# Patient Record
Sex: MALE | Race: BLACK OR AFRICAN AMERICAN | Employment: UNEMPLOYED | URBAN - METROPOLITAN AREA
[De-identification: names, ages, dates, MRNs, and addresses within clinical notes are randomized per-mention and may not be internally consistent; named-entity substitution may affect disease eponyms.]

---

## 2022-10-25 ENCOUNTER — OFFICE VISIT (OUTPATIENT)
Dept: FAMILY MEDICINE CLINIC | Age: 52
End: 2022-10-25
Payer: COMMERCIAL

## 2022-10-25 VITALS
HEART RATE: 64 BPM | WEIGHT: 293 LBS | DIASTOLIC BLOOD PRESSURE: 88 MMHG | TEMPERATURE: 98 F | RESPIRATION RATE: 20 BRPM | SYSTOLIC BLOOD PRESSURE: 124 MMHG | BODY MASS INDEX: 43.4 KG/M2 | HEIGHT: 69 IN

## 2022-10-25 DIAGNOSIS — E11.9 TYPE 2 DIABETES MELLITUS WITHOUT COMPLICATION, WITHOUT LONG-TERM CURRENT USE OF INSULIN (HCC): Primary | ICD-10-CM

## 2022-10-25 DIAGNOSIS — B00.9 HSV INFECTION: ICD-10-CM

## 2022-10-25 DIAGNOSIS — Z76.89 ENCOUNTER TO ESTABLISH CARE: ICD-10-CM

## 2022-10-25 DIAGNOSIS — Z99.89 OSA ON CPAP: ICD-10-CM

## 2022-10-25 DIAGNOSIS — M54.16 LUMBAR BACK PAIN WITH RADICULOPATHY AFFECTING RIGHT LOWER EXTREMITY: ICD-10-CM

## 2022-10-25 DIAGNOSIS — E78.2 MIXED HYPERLIPIDEMIA: ICD-10-CM

## 2022-10-25 DIAGNOSIS — I10 ESSENTIAL HYPERTENSION: ICD-10-CM

## 2022-10-25 DIAGNOSIS — I48.0 PAROXYSMAL A-FIB (HCC): ICD-10-CM

## 2022-10-25 DIAGNOSIS — G47.33 OSA ON CPAP: ICD-10-CM

## 2022-10-25 PROBLEM — I48.20 CHRONIC A-FIB (HCC): Status: ACTIVE | Noted: 2022-10-25

## 2022-10-25 LAB — HBA1C MFR BLD: 6.3 %

## 2022-10-25 PROCEDURE — G8484 FLU IMMUNIZE NO ADMIN: HCPCS | Performed by: NURSE PRACTITIONER

## 2022-10-25 PROCEDURE — G8427 DOCREV CUR MEDS BY ELIG CLIN: HCPCS | Performed by: NURSE PRACTITIONER

## 2022-10-25 PROCEDURE — 1036F TOBACCO NON-USER: CPT | Performed by: NURSE PRACTITIONER

## 2022-10-25 PROCEDURE — 3017F COLORECTAL CA SCREEN DOC REV: CPT | Performed by: NURSE PRACTITIONER

## 2022-10-25 PROCEDURE — 83036 HEMOGLOBIN GLYCOSYLATED A1C: CPT | Performed by: NURSE PRACTITIONER

## 2022-10-25 PROCEDURE — 2022F DILAT RTA XM EVC RTNOPTHY: CPT | Performed by: NURSE PRACTITIONER

## 2022-10-25 PROCEDURE — G8417 CALC BMI ABV UP PARAM F/U: HCPCS | Performed by: NURSE PRACTITIONER

## 2022-10-25 PROCEDURE — 3046F HEMOGLOBIN A1C LEVEL >9.0%: CPT | Performed by: NURSE PRACTITIONER

## 2022-10-25 PROCEDURE — 99204 OFFICE O/P NEW MOD 45 MIN: CPT | Performed by: NURSE PRACTITIONER

## 2022-10-25 RX ORDER — ACYCLOVIR 400 MG/1
TABLET ORAL
COMMUNITY
Start: 2022-08-07 | End: 2022-11-29 | Stop reason: SDUPTHER

## 2022-10-25 RX ORDER — ATORVASTATIN CALCIUM 40 MG/1
TABLET, FILM COATED ORAL
COMMUNITY
Start: 2022-08-07

## 2022-10-25 RX ORDER — WARFARIN SODIUM 5 MG/1
TABLET ORAL
COMMUNITY
Start: 2022-10-08

## 2022-10-25 RX ORDER — LOSARTAN POTASSIUM 100 MG/1
TABLET ORAL
COMMUNITY
Start: 2022-10-10

## 2022-10-25 SDOH — ECONOMIC STABILITY: TRANSPORTATION INSECURITY
IN THE PAST 12 MONTHS, HAS LACK OF TRANSPORTATION KEPT YOU FROM MEETINGS, WORK, OR FROM GETTING THINGS NEEDED FOR DAILY LIVING?: NO

## 2022-10-25 SDOH — ECONOMIC STABILITY: FOOD INSECURITY: WITHIN THE PAST 12 MONTHS, THE FOOD YOU BOUGHT JUST DIDN'T LAST AND YOU DIDN'T HAVE MONEY TO GET MORE.: NEVER TRUE

## 2022-10-25 SDOH — ECONOMIC STABILITY: FOOD INSECURITY: WITHIN THE PAST 12 MONTHS, YOU WORRIED THAT YOUR FOOD WOULD RUN OUT BEFORE YOU GOT MONEY TO BUY MORE.: NEVER TRUE

## 2022-10-25 SDOH — ECONOMIC STABILITY: TRANSPORTATION INSECURITY
IN THE PAST 12 MONTHS, HAS THE LACK OF TRANSPORTATION KEPT YOU FROM MEDICAL APPOINTMENTS OR FROM GETTING MEDICATIONS?: NO

## 2022-10-25 ASSESSMENT — PATIENT HEALTH QUESTIONNAIRE - PHQ9
SUM OF ALL RESPONSES TO PHQ9 QUESTIONS 1 & 2: 0
SUM OF ALL RESPONSES TO PHQ QUESTIONS 1-9: 0
2. FEELING DOWN, DEPRESSED OR HOPELESS: 0
1. LITTLE INTEREST OR PLEASURE IN DOING THINGS: 0
SUM OF ALL RESPONSES TO PHQ QUESTIONS 1-9: 0

## 2022-10-25 ASSESSMENT — SOCIAL DETERMINANTS OF HEALTH (SDOH): HOW HARD IS IT FOR YOU TO PAY FOR THE VERY BASICS LIKE FOOD, HOUSING, MEDICAL CARE, AND HEATING?: NOT HARD AT ALL

## 2022-10-25 ASSESSMENT — ENCOUNTER SYMPTOMS
EYE DISCHARGE: 0
CHEST TIGHTNESS: 0
NAUSEA: 0
SHORTNESS OF BREATH: 0
CONSTIPATION: 0
ABDOMINAL PAIN: 0
SINUS PAIN: 0
DIARRHEA: 0
COLOR CHANGE: 0
COUGH: 0
SINUS PRESSURE: 0
ABDOMINAL DISTENTION: 0
BACK PAIN: 1

## 2022-10-25 NOTE — PATIENT INSTRUCTIONS
Patient Education        Low Back Pain: Exercises  Introduction  Here are some examples of exercises for you to try. The exercises may be suggested for a condition or for rehabilitation. Start each exercise slowly. Ease off the exercises if you start to have pain. You will be told when to start these exercises and which ones will work best for you. How to do the exercises  Press-up  Lie on your stomach, supporting your body with your forearms. Press your elbows down into the floor to raise your upper back. As you do this, relax your stomach muscles and allow your back to arch without using your back muscles. As your press up, do not let your hips or pelvis come off the floor. Hold for 15 to 30 seconds, then relax. Repeat 2 to 4 times. Alternate arm and leg (bird dog) exercise  Do this exercise slowly. Try to keep your body straight at all times, and do not let one hip drop lower than the other. Start on the floor, on your hands and knees. Tighten your belly muscles. Raise one leg off the floor, and hold it straight out behind you. Be careful not to let your hip drop down, because that will twist your trunk. Hold for about 6 seconds, then lower your leg and switch to the other leg. Repeat 8 to 12 times on each leg. Over time, work up to holding for 10 to 30 seconds each time. If you feel stable and secure with your leg raised, try raising the opposite arm straight out in front of you at the same time. Knee-to-chest exercise  Lie on your back with your knees bent and your feet flat on the floor. Bring one knee to your chest, keeping the other foot flat on the floor (or keeping the other leg straight, whichever feels better on your lower back). Keep your lower back pressed to the floor. Hold for at least 15 to 30 seconds. Relax, and lower the knee to the starting position. Repeat with the other leg. Repeat 2 to 4 times with each leg.   To get more stretch, put your other leg flat on the floor while pulling your knee to your chest.  Curl-ups  Lie on the floor on your back with your knees bent at a 90-degree angle. Your feet should be flat on the floor, about 12 inches from your buttocks. Cross your arms over your chest. If this bothers your neck, try putting your hands behind your neck (not your head), with your elbows spread apart. Slowly tighten your belly muscles and raise your shoulder blades off the floor. Keep your head in line with your body, and do not press your chin to your chest.  Hold this position for 1 or 2 seconds, then slowly lower yourself back down to the floor. Repeat 8 to 12 times. Pelvic tilt exercise  Lie on your back with your knees bent. \"Brace\" your stomach. This means to tighten your muscles by pulling in and imagining your belly button moving toward your spine. You should feel like your back is pressing to the floor and your hips and pelvis are rocking back. Hold for about 6 seconds while you breathe smoothly. Repeat 8 to 12 times. Heel dig bridging  Lie on your back with both knees bent and your ankles bent so that only your heels are digging into the floor. Your knees should be bent about 90 degrees. Then push your heels into the floor, squeeze your buttocks, and lift your hips off the floor until your shoulders, hips, and knees are all in a straight line. Hold for about 6 seconds as you continue to breathe normally, and then slowly lower your hips back down to the floor and rest for up to 10 seconds. Do 8 to 12 repetitions. Hamstring stretch in doorway  Lie on your back in a doorway, with one leg through the open door. Slide your leg up the wall to straighten your knee. You should feel a gentle stretch down the back of your leg. Hold the stretch for at least 15 to 30 seconds. Do not arch your back, point your toes, or bend either knee. Keep one heel touching the floor and the other heel touching the wall. Repeat with your other leg.   Do 2 to 4 times for each leg.  Hip flexor stretch  Kneel on the floor with one knee bent and one leg behind you. Place your forward knee over your foot. Keep your other knee touching the floor. Slowly push your hips forward until you feel a stretch in the upper thigh of your rear leg. Hold the stretch for at least 15 to 30 seconds. Repeat with your other leg. Do 2 to 4 times on each side. Wall sit  Stand with your back 10 to 12 inches away from a wall. Lean into the wall until your back is flat against it. Slowly slide down until your knees are slightly bent, pressing your lower back into the wall. Hold for about 6 seconds, then slide back up the wall. Repeat 8 to 12 times. Follow-up care is a key part of your treatment and safety. Be sure to make and go to all appointments, and call your doctor if you are having problems. It's also a good idea to know your test results and keep a list of the medicines you take. Current as of: March 9, 2022               Content Version: 13.4  © 2006-2022 [employer]. Care instructions adapted under license by [facility] ([facility]). If you have questions about a medical condition or this instruction, always ask your healthcare professional. [place] [address] any warranty or liability for your use of this information. Patient Education        Sciatica: Exercises  Introduction  Here are some examples of typical rehabilitation exercises for your condition. Start each exercise slowly. Ease off the exercise if you start to have pain. Your doctor or physical therapist will tell you when you can start these exercises and which ones will work best for you. When you are not being active, find a comfortable position for rest. Some people are comfortable on the floor or a medium-firm bed with a small pillow under their head and another under their knees. Some people prefer to lie on their side with a pillow between their knees. Don't stay in one position for too long.   Take short walks (10 to 20 minutes) every 2 to 3 hours. Avoid slopes, hills, and stairs until you feel better. Walk only distances you can manage without pain, especially leg pain. How to do the exercises  Back stretches  Get down on your hands and knees on the floor. Relax your head and allow it to droop. Round your back up toward the ceiling until you feel a nice stretch in your upper, middle, and lower back. Hold this stretch for as long as it feels comfortable, or about 15 to 30 seconds. Return to the starting position with a flat back while you are on your hands and knees. Let your back sway by pressing your stomach toward the floor. Lift your buttocks toward the ceiling. Hold this position for 15 to 30 seconds. Repeat 2 to 4 times. Follow-up care is a key part of your treatment and safety. Be sure to make and go to all appointments, and call your doctor if you are having problems. It's also a good idea to know your test results and keep a list of the medicines you take. Current as of: March 9, 2022               Content Version: 13.4  © 2006-2022 [employer]. Care instructions adapted under license by [facility] ([facility]). If you have questions about a medical condition or this instruction, always ask your healthcare professional. [address] any warranty or liability for your use of this information. Patient Education        Back Stretches: Exercises  Introduction  Here are some examples of exercises for stretching your back. Start each exercise slowly. Ease off the exercise if you start to have pain. Your doctor or physical therapist will tell you when you can start these exercises and which ones will work best for you. How to do the exercises  Overhead stretch  Stand comfortably with your feet shoulder-width apart. Looking straight ahead, raise both arms over your head and reach toward the ceiling. Do not allow your head to tilt back.   Hold for 15 to 30 seconds,

## 2022-10-25 NOTE — PROGRESS NOTES
Date of Service:  10/25/2022    Zack Palacio (:  1970) is a 46 y.o. male, here for evaluation of the following medical concerns:    Chief Complaint   Patient presents with    New Patient     Establishing care, questions about disability         HPI    Patient here today to establish care. Patient previously a patient through [facility] internal medicine. Pt switching because  and patient fighting for his disability- pt has sleep apnea, afib, loop recorder, back pain with curvature in spine. Patient says [facility] not trying to help him other than writing a letter to job saying he can't lift more than 20 lbs. They don't want to complete paperwork. Pt has seen neurosurgery, says they keep giving shots in his back and says the shots help for a few months, maybe 4 months and takes ibuprofen for pain but that doesn't help much and neurosurgery is the one that gave him a note for his job to not lift more than 20 lbs. Pt says this is also second time with loop recorder as well, follows with [facility] cardiology as well. 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 Pt currently not working. Pt says he has not seen neurosurgery in over a year. Says he has been trying to see them but has not gotten return calls. Says his internal medicine with [facility] was supposedly sending them to try and get him connected but pt still had not heard anything back. Pt thinks last injection was around summer 2021. Pt has never seen pain management. Pt says he deals with the pain. Pt on losartan 100 mg for HTN. BP well controlled today. Pt is diabetic, A1C 6.4 2022. Will repeat that level today. Metformin 500 mg daily. Pt has LOBO and wears CPAP nightly. Pt follows with sleep medicine at [facility]- and when he naps he wears as well. HSV- takes acyclovir daily. Last outbreak- none. Afib- managed by cardiology. Coumadin managed by cardiology as well. Last INR 3.1. Pt thinks his goal is 2-3.  Reviewed cardiology note from 5 days ago and this appears accurate as they note he is mildly supratherapeutic. Pt also on daily lipitor for ASCVD elevated risk. Review of Systems   Constitutional:  Negative for activity change, appetite change, fatigue, fever and unexpected weight change. HENT:  Negative for congestion, ear pain, sinus pressure and sinus pain. Eyes:  Negative for discharge and visual disturbance. Respiratory:  Negative for cough, chest tightness and shortness of breath. Cardiovascular:  Negative for chest pain, palpitations and leg swelling. Gastrointestinal:  Negative for abdominal distention, abdominal pain, constipation, diarrhea and nausea. Endocrine: Negative for cold intolerance, heat intolerance, polydipsia, polyphagia and polyuria. Genitourinary:  Negative for decreased urine volume, difficulty urinating, dysuria, flank pain, frequency and urgency. Musculoskeletal:  Positive for back pain. Negative for arthralgias, gait problem, joint swelling, myalgias and neck pain. Skin:  Negative for color change, rash and wound. Allergic/Immunologic: Negative for food allergies and immunocompromised state. Neurological:  Positive for numbness. Negative for dizziness, tremors, speech difficulty, weakness, light-headedness and headaches. Hematological:  Negative for adenopathy. Does not bruise/bleed easily. Psychiatric/Behavioral:  Negative for confusion, decreased concentration, self-injury, sleep disturbance and suicidal ideas. The patient is not nervous/anxious. Prior to Visit Medications    Medication Sig Taking?  Authorizing Provider   acyclovir (ZOVIRAX) 400 MG tablet TAKE 1 TABLET BY MOUTH TWICE A DAY Yes Historical Provider, MD   atorvastatin (LIPITOR) 40 MG tablet TAKE 1 TABLET BY MOUTH EVERY DAY Yes Historical Provider, MD   losartan (COZAAR) 100 MG tablet TAKE 1 TABLET BY MOUTH EVERY DAY Yes Historical Provider, MD   metFORMIN (GLUCOPHAGE) 500 MG tablet TAKE 1 TABLET BY MOUTH EVERY DAY Yes Historical Provider, MD   warfarin (COUMADIN) 5 MG tablet TAKE 2 TABS TUES, THURS, SAT AND SUN AND 3 TABS MON, WED, FRI OR AS DIRECTED Yes Historical Provider, MD        No Known Allergies    History reviewed. No pertinent past medical history. History reviewed. No pertinent surgical history. Social History     Tobacco Use    Smoking status: Former     Packs/day: 0.50     Types: Cigarettes     Start date: 1990     Quit date: 2010     Years since quittin.8    Smokeless tobacco: Never   Vaping Use    Vaping Use: Former   Substance Use Topics    Drug use: Yes     Types: Marijuana Priti Cotton)     Comment: occasionally- weekly        History reviewed. No pertinent family history. Vitals:    10/25/22 0903   BP: 124/88   Site: Right Upper Arm   Position: Sitting   Cuff Size: Large Adult   Pulse: 64   Resp: 20   Temp: 98 °F (36.7 °C)   TempSrc: Temporal   Weight: 293 lb (132.9 kg)   Height: 5' 9\" (1.753 m)     Estimated body mass index is 43.27 kg/m² as calculated from the following:    Height as of this encounter: 5' 9\" (1.753 m). Weight as of this encounter: 293 lb (132.9 kg). Physical Exam  Vitals reviewed. Constitutional:       General: He is awake. Appearance: Normal appearance. He is well-developed and well-groomed. He is morbidly obese. He is not ill-appearing or toxic-appearing. HENT:      Head: Normocephalic and atraumatic. Right Ear: Hearing, tympanic membrane, ear canal and external ear normal.      Left Ear: Hearing, tympanic membrane, ear canal and external ear normal.      Nose: Nose normal.      Mouth/Throat:      Lips: Pink. Mouth: Mucous membranes are moist.      Pharynx: Oropharynx is clear. Eyes:      General: Lids are normal.      Extraocular Movements: Extraocular movements intact. Conjunctiva/sclera: Conjunctivae normal.      Pupils: Pupils are equal, round, and reactive to light. Neck:      Thyroid: No thyromegaly. Vascular: No carotid bruit. Cardiovascular:      Rate and Rhythm: Normal rate. Pulses: Normal pulses. Carotid pulses are 2+ on the right side and 2+ on the left side. Radial pulses are 2+ on the right side and 2+ on the left side. Posterior tibial pulses are 2+ on the right side and 2+ on the left side. Heart sounds: Normal heart sounds, S1 normal and S2 normal. Heart sounds not distant. No murmur heard. Pulmonary:      Effort: Pulmonary effort is normal.      Breath sounds: Normal breath sounds. Abdominal:      General: Bowel sounds are normal. There is no abdominal bruit. Palpations: Abdomen is soft. Tenderness: There is no abdominal tenderness. Genitourinary:     Comments: Deferred  Musculoskeletal:         General: Normal range of motion. Cervical back: Full passive range of motion without pain, normal range of motion and neck supple. Right lower leg: No edema. Left lower leg: No edema. Lymphadenopathy:      Head:      Right side of head: No submental, submandibular, tonsillar, preauricular, posterior auricular or occipital adenopathy. Left side of head: No submental, submandibular, tonsillar, preauricular, posterior auricular or occipital adenopathy. Cervical: No cervical adenopathy. Right cervical: No superficial, deep or posterior cervical adenopathy. Left cervical: No superficial, deep or posterior cervical adenopathy. Upper Body:      Right upper body: No supraclavicular adenopathy. Left upper body: No supraclavicular adenopathy. Skin:     General: Skin is warm and dry. Capillary Refill: Capillary refill takes less than 2 seconds. Neurological:      General: No focal deficit present. Mental Status: He is alert and oriented to person, place, and time. Mental status is at baseline. Motor: Motor function is intact. No weakness. Coordination: Coordination is intact. Gait: Gait is intact.    Psychiatric: Attention and Perception: Attention and perception normal.         Mood and Affect: Mood and affect normal.         Speech: Speech normal.         Behavior: Behavior normal. Behavior is cooperative. Thought Content: Thought content normal.         Cognition and Memory: Cognition and memory normal.         Judgment: Judgment normal.       ASSESSMENT/PLAN:  1. Type 2 diabetes mellitus without complication, without long-term current use of insulin (HCC)  -     POCT glycosylated hemoglobin (Hb A1C)   A1C today is 6.3   Continue metformin 500 mg daily   Information printed and reviewed   Weight loss, initial goal 10% of body weight recommended   Physical activity 150 minutes weekly recommended   2. Encounter to establish care   Discussed office policies/practices/provider team   Reviewed medical, social, family history and medications   MyChart activation and usage discussed  3. Essential hypertension   Continue losartan 100 mg daily    BP well controlled at visit today   Follow a low sodium diet   Physical activity 150 minutes weekly recommended    Weight loss, initial goal 10% of body weight recommended   4. LOBO on CPAP   Continue with CPAP   Follow with [facility] sleep medicine regularly   Work on weight loss  5. Mixed hyperlipidemia   Continue on lipitor 40 mg nightly   Due for lipid panel spring 2023   Work on limiting saturated fats in diet, and eating a healthy balance of fruits, vegetables, lean proteins, and multigrains. Physical activity 150 minutes weekly recommended    Weight loss, initial goal 10% of body weight recommended  6. HSV infection   Continue on daily acyclovir  7. Paroxysmal A-fib (HCC)   Continue on coumadin, follow with [facility] cardiology for INR management  8.  Lumbar back pain with radiculopathy affecting right lower extremity  -     [facility] - Alonzo Lezama MD, Neurosurgery (Spine), [facility]   Continue on ibuprofen PRN   Given back exercises and stretches   Counseled on weight loss and posture   Ice 15 min/heat 15 min at least twice daily   Voltaren gel over the counter 4 times daily as needed for aches/pains    Care Gaps Addressed  Lipids March 2022 through [facility]- reviewed in Care everywhere  HIV screen recommended  Hep C screen recommended with next blood draw   Diabetes screen due today  Colon cancer screening discussed- says he had one a few years ago in [facility], remembers drinking the stuff and having camera up his behind  Call insurance company to discuss coverage for shingles vaccine (Shingrix) 2 dose series   COVID booster recommended  Flu vaccine recommended - insurance restrictions here        I have reviewed patient's pertinent medical history, relevant laboratory and imaging studies, and past/future health maintenance. Discussed with the patient the importance of adhering to their current medication regimen as directed. Advised the patient that they should continue to work on eating a healthy balanced diet and staying active by exercising within their personal limits. Orders as listed above. Patient was advised to keep future appointments with their respective specialty care team(s). Patient had the opportunity to ask questions, all of which were answered to the best of my ability and with patient satisfaction. Patient understands and is agreeable with the care plan following today's visit. Patient is to schedule an appointment for any new or worsening symptoms. Go to ER for significant shortness of breath, chest pain, or uncontrolled pain or fever. I discussed with patient the risk and benefits of any medications that were prescribed today. I verified that the patient understands their medications, labs, and/or procedures. The patient is doing well with current medication regimen and does not have any barriers to adherence. The patient's self-management abilities are good.      Return in about 6 months (around 4/25/2023) for Physical Exam and Labs, Diabetes Follow Up, HTN Follow Up, Fasting Labs. An  electronic signature was used to authenticate this note.     --KAMALA Garcia - CNP on 10/25/2022 at 9:45 AM

## 2022-10-27 ENCOUNTER — TELEPHONE (OUTPATIENT)
Dept: FAMILY MEDICINE CLINIC | Age: 52
End: 2022-10-27

## 2022-10-27 DIAGNOSIS — M54.16 LUMBAR BACK PAIN WITH RADICULOPATHY AFFECTING RIGHT LOWER EXTREMITY: Primary | ICD-10-CM

## 2022-10-27 NOTE — TELEPHONE ENCOUNTER
He needs to get a MRI of his back - needs a referral     He called [facility] - they want him to have the MRI done first and then they will make him an appt.         Pt @  [phone] - please let him know when done

## 2022-11-08 ENCOUNTER — HOSPITAL ENCOUNTER (OUTPATIENT)
Dept: MRI IMAGING | Age: 52
Discharge: HOME | End: 2022-11-08
Payer: COMMERCIAL

## 2022-11-08 DIAGNOSIS — M54.16 LUMBAR BACK PAIN WITH RADICULOPATHY AFFECTING RIGHT LOWER EXTREMITY: ICD-10-CM

## 2022-11-08 PROCEDURE — 72148 MRI LUMBAR SPINE W/O DYE: CPT

## 2022-11-29 ENCOUNTER — TELEPHONE (OUTPATIENT)
Dept: FAMILY MEDICINE CLINIC | Age: 52
End: 2022-11-29

## 2022-11-29 DIAGNOSIS — E11.9 TYPE 2 DIABETES MELLITUS WITHOUT COMPLICATION, WITHOUT LONG-TERM CURRENT USE OF INSULIN (HCC): ICD-10-CM

## 2022-11-29 DIAGNOSIS — B00.9 HSV INFECTION: Primary | ICD-10-CM

## 2022-11-29 RX ORDER — ACYCLOVIR 400 MG/1
TABLET ORAL
Qty: 90 TABLET | Refills: 3 | Status: SHIPPED | OUTPATIENT
Start: 2022-11-29

## 2022-12-06 ENCOUNTER — TELEPHONE (OUTPATIENT)
Dept: FAMILY MEDICINE CLINIC | Age: 52
End: 2022-12-06

## 2022-12-06 DIAGNOSIS — B00.9 HSV INFECTION: ICD-10-CM

## 2022-12-06 RX ORDER — ACYCLOVIR 400 MG/1
TABLET ORAL
Qty: 180 TABLET | Refills: 3 | Status: SHIPPED | OUTPATIENT
Start: 2022-12-06

## 2022-12-06 NOTE — TELEPHONE ENCOUNTER
Adjusted to twice daily.  My note said daily from our discussion but I adjusted script and sent for BID

## 2022-12-06 NOTE — TELEPHONE ENCOUNTER
Patient is calling because the direction on the medication ACYCLOVIR 400 MG TABLET IS DIFFERENT FROM HIS LAST REFILL. DID WE CHANGE THE DIRECTION? PLEASE GIVE HIM A CALL BACK.

## 2023-01-10 ENCOUNTER — OFFICE VISIT (OUTPATIENT)
Dept: FAMILY MEDICINE CLINIC | Age: 53
End: 2023-01-10
Payer: COMMERCIAL

## 2023-01-10 VITALS
DIASTOLIC BLOOD PRESSURE: 80 MMHG | HEART RATE: 74 BPM | OXYGEN SATURATION: 96 % | RESPIRATION RATE: 16 BRPM | TEMPERATURE: 98.3 F | WEIGHT: 294 LBS | HEIGHT: 69 IN | SYSTOLIC BLOOD PRESSURE: 132 MMHG | BODY MASS INDEX: 43.55 KG/M2

## 2023-01-10 DIAGNOSIS — F12.90 MARIJUANA USE: ICD-10-CM

## 2023-01-10 DIAGNOSIS — E78.6 LOW HDL (UNDER 40): ICD-10-CM

## 2023-01-10 DIAGNOSIS — Z11.59 ENCOUNTER FOR HEPATITIS C SCREENING TEST FOR LOW RISK PATIENT: ICD-10-CM

## 2023-01-10 DIAGNOSIS — Z01.818 PRE-OP EXAMINATION: Primary | ICD-10-CM

## 2023-01-10 DIAGNOSIS — Z12.5 PROSTATE CANCER SCREENING: ICD-10-CM

## 2023-01-10 DIAGNOSIS — M48.061 LUMBAR FORAMINAL STENOSIS: ICD-10-CM

## 2023-01-10 LAB
A/G RATIO: 1.6 (ref 1.1–2.2)
ALBUMIN SERPL-MCNC: 4.2 G/DL (ref 3.4–5)
ALP BLD-CCNC: 83 U/L (ref 40–129)
ALT SERPL-CCNC: 37 U/L (ref 10–40)
ANION GAP SERPL CALCULATED.3IONS-SCNC: 12 MMOL/L (ref 3–16)
APTT: 30.5 SEC (ref 23–34.3)
AST SERPL-CCNC: 20 U/L (ref 15–37)
BASOPHILS ABSOLUTE: 0 K/UL (ref 0–0.2)
BASOPHILS RELATIVE PERCENT: 0.3 %
BILIRUB SERPL-MCNC: 0.3 MG/DL (ref 0–1)
BUN BLDV-MCNC: 17 MG/DL (ref 7–20)
CALCIUM SERPL-MCNC: 9.8 MG/DL (ref 8.3–10.6)
CHLORIDE BLD-SCNC: 106 MMOL/L (ref 99–110)
CHOLESTEROL, TOTAL: 125 MG/DL (ref 0–199)
CO2: 24 MMOL/L (ref 21–32)
CREAT SERPL-MCNC: 1 MG/DL (ref 0.9–1.3)
EOSINOPHILS ABSOLUTE: 0.1 K/UL (ref 0–0.6)
EOSINOPHILS RELATIVE PERCENT: 1.5 %
GFR SERPL CREATININE-BSD FRML MDRD: >60 ML/MIN/{1.73_M2}
GLUCOSE BLD-MCNC: 116 MG/DL (ref 70–99)
HCT VFR BLD CALC: 41.6 % (ref 40.5–52.5)
HDLC SERPL-MCNC: 38 MG/DL (ref 40–60)
HEMOGLOBIN: 13.2 G/DL (ref 13.5–17.5)
HEPATITIS C ANTIBODY INTERPRETATION: NORMAL
INR BLD: 1.63 (ref 0.87–1.14)
LDL CHOLESTEROL CALCULATED: 74 MG/DL
LYMPHOCYTES ABSOLUTE: 2.5 K/UL (ref 1–5.1)
LYMPHOCYTES RELATIVE PERCENT: 30.9 %
MCH RBC QN AUTO: 23.2 PG (ref 26–34)
MCHC RBC AUTO-ENTMCNC: 31.7 G/DL (ref 31–36)
MCV RBC AUTO: 73.4 FL (ref 80–100)
MONOCYTES ABSOLUTE: 0.7 K/UL (ref 0–1.3)
MONOCYTES RELATIVE PERCENT: 8.1 %
NEUTROPHILS ABSOLUTE: 4.8 K/UL (ref 1.7–7.7)
NEUTROPHILS RELATIVE PERCENT: 59.2 %
PDW BLD-RTO: 16.6 % (ref 12.4–15.4)
PLATELET # BLD: 180 K/UL (ref 135–450)
PMV BLD AUTO: 9.8 FL (ref 5–10.5)
POTASSIUM SERPL-SCNC: 4.1 MMOL/L (ref 3.5–5.1)
PROSTATE SPECIFIC ANTIGEN: 0.3 NG/ML (ref 0–4)
PROTHROMBIN TIME: 19.3 SEC (ref 11.7–14.5)
RBC # BLD: 5.68 M/UL (ref 4.2–5.9)
SODIUM BLD-SCNC: 142 MMOL/L (ref 136–145)
TOTAL PROTEIN: 6.9 G/DL (ref 6.4–8.2)
TRIGL SERPL-MCNC: 67 MG/DL (ref 0–150)
VLDLC SERPL CALC-MCNC: 13 MG/DL
WBC # BLD: 8.1 K/UL (ref 4–11)

## 2023-01-10 PROCEDURE — 3079F DIAST BP 80-89 MM HG: CPT | Performed by: NURSE PRACTITIONER

## 2023-01-10 PROCEDURE — 99214 OFFICE O/P EST MOD 30 MIN: CPT | Performed by: NURSE PRACTITIONER

## 2023-01-10 PROCEDURE — 3075F SYST BP GE 130 - 139MM HG: CPT | Performed by: NURSE PRACTITIONER

## 2023-01-10 PROCEDURE — G8484 FLU IMMUNIZE NO ADMIN: HCPCS | Performed by: NURSE PRACTITIONER

## 2023-01-10 PROCEDURE — 1036F TOBACCO NON-USER: CPT | Performed by: NURSE PRACTITIONER

## 2023-01-10 PROCEDURE — G8417 CALC BMI ABV UP PARAM F/U: HCPCS | Performed by: NURSE PRACTITIONER

## 2023-01-10 PROCEDURE — 3017F COLORECTAL CA SCREEN DOC REV: CPT | Performed by: NURSE PRACTITIONER

## 2023-01-10 PROCEDURE — 36415 COLL VENOUS BLD VENIPUNCTURE: CPT | Performed by: NURSE PRACTITIONER

## 2023-01-10 PROCEDURE — G8427 DOCREV CUR MEDS BY ELIG CLIN: HCPCS | Performed by: NURSE PRACTITIONER

## 2023-01-10 RX ORDER — CHLORHEXIDINE GLUCONATE 0.12 MG/ML
RINSE ORAL
COMMUNITY
Start: 2022-12-04

## 2023-01-10 ASSESSMENT — ENCOUNTER SYMPTOMS
SINUS PRESSURE: 0
NAUSEA: 0
ABDOMINAL DISTENTION: 0
COUGH: 0
CHEST TIGHTNESS: 0
SINUS PAIN: 0
CONSTIPATION: 0
BACK PAIN: 0
DIARRHEA: 0
COLOR CHANGE: 0
EYE DISCHARGE: 0
SHORTNESS OF BREATH: 0
ABDOMINAL PAIN: 0

## 2023-01-10 ASSESSMENT — PATIENT HEALTH QUESTIONNAIRE - PHQ9
SUM OF ALL RESPONSES TO PHQ QUESTIONS 1-9: 0
2. FEELING DOWN, DEPRESSED OR HOPELESS: 0
1. LITTLE INTEREST OR PLEASURE IN DOING THINGS: 0
SUM OF ALL RESPONSES TO PHQ QUESTIONS 1-9: 0
SUM OF ALL RESPONSES TO PHQ QUESTIONS 1-9: 0
SUM OF ALL RESPONSES TO PHQ9 QUESTIONS 1 & 2: 0
SUM OF ALL RESPONSES TO PHQ QUESTIONS 1-9: 0

## 2023-01-10 NOTE — PROGRESS NOTES
Preoperative Consultation      Zack Palacio  YOB: 1970    Date of Service:  1/10/2023    Vitals:    01/10/23 0907   BP: 132/80   Site: Right Upper Arm   Position: Sitting   Cuff Size: Large Adult   Pulse: 74   Resp: 16   Temp: 98.3 °F (36.8 °C)   TempSrc: Oral   SpO2: 96%   Weight: 294 lb (133.4 kg)   Height: 5' 9\" (1.753 m)      Wt Readings from Last 2 Encounters:   01/10/23 294 lb (133.4 kg)   10/25/22 293 lb (132.9 kg)     BP Readings from Last 3 Encounters:   01/10/23 132/80   10/25/22 124/88        Chief Complaint   Patient presents with    Pre-op Exam     Back surgery at [facility] on 01/24/23     No Known Allergies  Outpatient Medications Marked as Taking for the 1/10/23 encounter (Office Visit) with KAMALA Dudley CNP   Medication Sig Dispense Refill    acyclovir (ZOVIRAX) 400 MG tablet TAKE 1 TABLET BY MOUTH TWICE DAILY 180 tablet 3    metFORMIN (GLUCOPHAGE) 500 MG tablet TAKE 1 TABLET BY MOUTH EVERY DAY 90 tablet 0    atorvastatin (LIPITOR) 40 MG tablet TAKE 1 TABLET BY MOUTH EVERY DAY      losartan (COZAAR) 100 MG tablet TAKE 1 TABLET BY MOUTH EVERY DAY      warfarin (COUMADIN) 5 MG tablet TAKE 2 TABS TUES, THURS, SAT AND SUN AND 3 TABS MON, WED, FRI OR AS DIRECTED         This patient presents to the office today for a preoperative consultation at the request of surgeon, Dr. Zeinab Castanon and Dede Rhodes, who plans on performing L3-5 ANTERIOR LUMBAR INTERBODY FUSION WITH PEDICLE SCREW FIXATION on January 24 at [place] [address].  The current problem began 5 years ago, and symptoms have been worsening with time. Conservative therapy:  therapies, injections .     Planned anesthesia: General   Known anesthesia problems: None   Bleeding risk: coumadin  Personal or FH of DVT/PE: No    Patient objection to receiving blood products: No    Patient Active Problem List   Diagnosis    Type 2 diabetes mellitus without complication, without long-term current use of insulin ([facility] [address] Essential hypertension    LOBO on CPAP    Mixed hyperlipidemia    HSV infection    Paroxysmal A-fib (HCC)    Lumbar back pain with radiculopathy affecting right lower extremity    Marijuana use    Lumbar foraminal stenosis    Low HDL (under 40)       History reviewed. No pertinent past medical history. History reviewed. No pertinent surgical history. History reviewed. No pertinent family history. Social History     Socioeconomic History    Marital status: Single     Spouse name: Not on file    Number of children: Not on file    Years of education: Not on file    Highest education level: Not on file   Occupational History    Not on file   Tobacco Use    Smoking status: Former     Packs/day: 0.50     Years: 20.00     Pack years: 10.00     Types: Cigarettes     Start date: 1990     Quit date: 2010     Years since quittin.0     Passive exposure: Past    Smokeless tobacco: Never   Vaping Use    Vaping Use: Former   Substance and Sexual Activity    Alcohol use: Not on file    Drug use: Yes     Types: Marijuana Nan Shayy)     Comment: occasionally- weekly    Sexual activity: Not on file   Other Topics Concern    Not on file   Social History Narrative    Not on file     Social Determinants of Health     Financial Resource Strain: Low Risk     Difficulty of Paying Living Expenses: Not hard at all   Food Insecurity: No Food Insecurity    Worried About [address] in the Last Year: Never true    [address] in the Last Year: Never true   Transportation Needs: No Transportation Needs    Lack of Transportation (Medical): No    Lack of Transportation (Non-Medical): No   Physical Activity: Not on file   Stress: Not on file   Social Connections: Not on file   Intimate Partner Violence: Not on file   Housing Stability: Not on file       Review of Systems   Constitutional:  Negative for activity change, appetite change, fatigue, fever and unexpected weight change.    HENT:  Negative for congestion, ear pain, sinus pressure and sinus pain. Eyes:  Negative for discharge and visual disturbance. Respiratory:  Negative for cough, chest tightness and shortness of breath. Cardiovascular:  Negative for chest pain, palpitations and leg swelling. Gastrointestinal:  Negative for abdominal distention, abdominal pain, constipation, diarrhea and nausea. Endocrine: Negative for cold intolerance, heat intolerance, polydipsia, polyphagia and polyuria. Genitourinary:  Negative for decreased urine volume, difficulty urinating, dysuria, flank pain, frequency and urgency. Musculoskeletal:  Negative for arthralgias, back pain, gait problem, joint swelling, myalgias and neck pain. Skin:  Negative for color change, rash and wound. Allergic/Immunologic: Negative for food allergies and immunocompromised state. Neurological:  Negative for dizziness, tremors, speech difficulty, weakness, light-headedness, numbness and headaches. Pain into right groin   Hematological:  Negative for adenopathy. Does not bruise/bleed easily. Psychiatric/Behavioral:  Negative for confusion, decreased concentration, self-injury, sleep disturbance and suicidal ideas. The patient is not nervous/anxious. Physical Exam  Vitals reviewed. Constitutional:       General: He is awake. Appearance: Normal appearance. He is well-developed and well-groomed. He is morbidly obese. He is not ill-appearing or toxic-appearing. HENT:      Head: Normocephalic and atraumatic. Right Ear: Hearing, tympanic membrane, ear canal and external ear normal.      Left Ear: Hearing, tympanic membrane, ear canal and external ear normal.      Nose: Nose normal.      Mouth/Throat:      Lips: Pink. Mouth: Mucous membranes are moist.      Pharynx: Oropharynx is clear. Eyes:      General: Lids are normal.      Extraocular Movements: Extraocular movements intact.       Conjunctiva/sclera: Conjunctivae normal.      Pupils: Pupils are equal, round, and reactive to light. Neck:      Thyroid: No thyromegaly. Vascular: No carotid bruit. Cardiovascular:      Rate and Rhythm: Normal rate. Pulses: Normal pulses. Carotid pulses are 2+ on the right side and 2+ on the left side. Radial pulses are 2+ on the right side and 2+ on the left side. Posterior tibial pulses are 2+ on the right side and 2+ on the left side. Heart sounds: Normal heart sounds, S1 normal and S2 normal. Heart sounds not distant. No murmur heard. Pulmonary:      Effort: Pulmonary effort is normal.      Breath sounds: Normal breath sounds. Abdominal:      General: Bowel sounds are normal. There is no abdominal bruit. Palpations: Abdomen is soft. Tenderness: There is no abdominal tenderness. Genitourinary:     Comments: Deferred  Musculoskeletal:         General: Normal range of motion. Cervical back: Full passive range of motion without pain, normal range of motion and neck supple. Right lower leg: No edema. Left lower leg: No edema. Lymphadenopathy:      Head:      Right side of head: No submental, submandibular, tonsillar, preauricular, posterior auricular or occipital adenopathy. Left side of head: No submental, submandibular, tonsillar, preauricular, posterior auricular or occipital adenopathy. Cervical: No cervical adenopathy. Right cervical: No superficial, deep or posterior cervical adenopathy. Left cervical: No superficial, deep or posterior cervical adenopathy. Upper Body:      Right upper body: No supraclavicular adenopathy. Left upper body: No supraclavicular adenopathy. Skin:     General: Skin is warm and dry. Capillary Refill: Capillary refill takes less than 2 seconds. Neurological:      General: No focal deficit present. Mental Status: He is alert and oriented to person, place, and time. Mental status is at baseline. Motor: Motor function is intact. No weakness. Coordination: Coordination is intact. Gait: Gait is intact. Psychiatric:         Attention and Perception: Attention and perception normal.         Mood and Affect: Mood and affect normal.         Speech: Speech normal.         Behavior: Behavior normal. Behavior is cooperative. Thought Content: Thought content normal.         Cognition and Memory: Cognition and memory normal.         Judgment: Judgment normal.        EKG Interpretation:  ordered, not available here in office at this time. Lab Review   Office Visit on 10/25/2022   Component Date Value    Hemoglobin A1C 10/25/2022 6.3         MRI reviewed  Impression   1. No fracture or bony destructive lesion. 2. Disc bulges, most notable at L2-3 and L3-4.   3. Grade 1 anterolisthesis at L4-5.   4. Mild central canal stenoses at L2-3, L3-4 and L4-5.   5.  Multilevel neural foraminal stenoses, worst (moderate to severe) at L4-5. Assessment:       46 y.o. patient with planned surgery as above. Known risk factors for perioperative complications: Arrhythmia, Diabetes mellitus, Hypertension, Illicit drug use, Obstructive sleep apnea- afib, diabetes, HTN, marijuana, CPAP  Current medications which may produce withdrawal symptoms if withheld perioperatively: none      Plan:     1. Preoperative workup as follows: ECG, hemoglobin, hematocrit, electrolytes, creatinine, liver function studies, coagulation studies  2.  Change in medication regimen before surgery: Take losartan on morning of surgery with sip of water, and hold all other medications until after surgery, Hold all medications on morning of surgery, Discontinue ASA 7 days before surgery, Discontinue NSAIDs (Ibuprofen, aleve, advil, motrin, naprosyn, naproxen, etc) 7 days before surgery, Discontinue metformin 1 days before surgery, Discontinue vitamins and supplements 7 days before surgery, Warfarin management- Discontinue warfarin 7 days before surgery and resume per cardiology and surgeon agreement following surgery  3. Prophylaxis for cardiac events with perioperative beta-blockers: Not indicated  ACC/AHA indications for pre-operative beta-blocker use:    Vascular surgery with history of postitive stress test  Intermediate or high risk surgery with history of CAD   Intermediate or high risk surgery with multiple clinical predictors of CAD- 2 of the following: history of compensated or prior heart failure, history of cerebrovascular disease, DM, or renal insufficiency    Routine administration of higher-dose, long-acting metoprolol in beta-blocker-naïve patients on the day of surgery, and in the absence of dose titration is associated with an overall increase in mortality. Beta-blockers should be started days to weeks prior to surgery and titrated to pulse < 70.  4. Deep vein thrombosis prophylaxis: regimen to be chosen by surgical team  5. No contraindications to planned surgery. Discussed warfarin with cardiology already per pt, was told to hold 7-10 days before surgery      1. Pre-op examination  -     EKG 12 lead; Future  -     CBC with Auto Differential; Future  -     APTT; Future  -     Comprehensive Metabolic Panel; Future  -     Protime-INR; Future   Cleared for sx and anesthesia but pt aware he must have EKG completed prior to sx   We do not have EKG available here at office due to lack of equipment and so pt knows he has order to have done at the hospital and is aware it is walk in service m-F 8-4 first come first serve   Paperwork completed and will be faxed, pt also given a copy  2. Lumbar foraminal stenosis   Reviewed MRI, plan for sx   Pains in right groin and right thigh, reviewed dermatoma map with patient, likely related to low back issues, hopefully will improve or resolve with surgery  3. Marijuana use   Aware without medical marijuana card this is illegal use  4. Low HDL (under 40)  -     Lipid Panel; Future   Fasting today, will do labs today  5.  Encounter for hepatitis C screening test for low risk patient  -     Hepatitis C Antibody; Future  6. Prostate cancer screening  -     PSA Screening; Future    Care Gaps Addressed  PNA vaccine recommended- insurance restriction here  Diabetes check up due- not here for that today  Lipids today  Annual eye exam recommended for diabetic retinal exam, please ask eye doctor to fax us the report  [facility] Fax [phone]  GFR today with labs  Hep C screen recommended with next blood draw   Hep B vaccine recommended- insurance restriction here  Call insurance company to discuss coverage for shingles vaccine (Shingrix) 2 dose series   COVID booster recommended  Flu vaccine recommended - insurance restriction here  DR. [facility] 2021      I have reviewed patient's pertinent medical history, relevant laboratory and imaging studies, and past/future health maintenance. Discussed with the patient the importance of adhering to their current medication regimen as directed. Advised the patient that they should continue to work on eating a healthy balanced diet and staying active by exercising within their personal limits. Orders as listed above. Patient was advised to keep future appointments with their respective specialty care team(s). Patient had the opportunity to ask questions, all of which were answered to the best of my ability and with patient satisfaction. Patient understands and is agreeable with the care plan following today's visit. Patient is to schedule an appointment for any new or worsening symptoms. Go to ER for significant shortness of breath, chest pain, or uncontrolled pain or fever. I discussed with patient the risk and benefits of any medications that were prescribed today. I verified that the patient understands their medications, labs, and/or procedures. The patient is doing well with current medication regimen and does not have any barriers to adherence. The patient's self-management abilities are good.      Follow Up in 4 Months for physical and diabetes

## 2023-01-10 NOTE — PATIENT INSTRUCTIONS
Schedule EKG-       Change in medication regimen before surgery: Take losartan on morning of surgery with sip of water, and hold all other medications until after surgery, Hold all medications on morning of surgery, Discontinue ASA 7 days before surgery, Discontinue NSAIDs (Ibuprofen, aleve, advil, motrin, naprosyn, naproxen, etc) 7 days before surgery, Discontinue metformin 1 days before surgery, Discontinue vitamins and supplements 7 days before surgery, Warfarin management- Discontinue warfarin 7 days before surgery and resume per cardiology and surgeon agreement following surgery

## 2023-01-11 ENCOUNTER — HOSPITAL ENCOUNTER (OUTPATIENT)
Age: 53
Discharge: HOME | End: 2023-01-11
Payer: COMMERCIAL

## 2023-01-11 DIAGNOSIS — Z01.818 PRE-OP EXAMINATION: ICD-10-CM

## 2023-01-11 LAB
EKG ATRIAL RATE: 65 BPM
EKG DIAGNOSIS: NORMAL
EKG P AXIS: 53 DEGREES
EKG P-R INTERVAL: 182 MS
EKG Q-T INTERVAL: 442 MS
EKG QRS DURATION: 98 MS
EKG QTC CALCULATION (BAZETT): 459 MS
EKG R AXIS: -15 DEGREES
EKG T AXIS: 0 DEGREES
EKG VENTRICULAR RATE: 65 BPM

## 2023-01-11 PROCEDURE — 93010 ELECTROCARDIOGRAM REPORT: CPT | Performed by: INTERNAL MEDICINE

## 2023-01-11 PROCEDURE — 93005 ELECTROCARDIOGRAM TRACING: CPT

## 2023-01-19 NOTE — PROGRESS NOTES
[facility] PRE-SURGICAL TESTING INSTRUCTIONS                      PRIOR TO PROCEDURE DATE:    1. PLEASE FOLLOW ANY INSTRUCTIONS GIVEN TO YOU PER YOUR SURGEON. 2. Arrange for someone to drive you home and be with you for the first 24 hours after discharge for your safety after your procedure for which you received sedation. Ensure it is someone we can share information with regarding your discharge. NOTE: At this time ONLY 2 ADULTS may accompany you   One person ENCOURAGED to stay at hospital entire time if outpatient surgery      3. You must contact your surgeon for instructions IF:  You are taking any blood thinners, aspirin, anti-inflammatory or vitamins. There is a change in your physical condition such as a cold, fever, rash, cuts, sores, or any other infection, especially near your surgical site. 4. Do not drink alcohol the day before or day of your procedure. Do not use any recreational marijuana at least 24 hours or street drugs (heroin, cocaine) at minimum 5 days prior to your procedure. 5. A Pre-Surgical History and Physical MUST be completed WITHIN 30 DAYS OR LESS prior to your procedure. by your Physician or an Urgent Care        THE DAY OF YOUR PROCEDURE:  1. Follow instructions for ARRIVAL TIME as DIRECTED BY YOUR SURGEON. 2. Enter the MAIN entrance from [employer] and follow the signs to the free [employer] or [employer] (offered free of charge 7 am-5pm). 3. Enter the Main Entrance of the hospital (do not enter from the lower level of the parking garage). Upon entrance, check in with the  at the surgical information desk on your LEFT. Bring your insurance card and photo ID to register      4. DO NOT EAT ANYTHING 8 hours prior to arrival for surgery. You may have up to 8 ounces of water 4 hours prior to your arrival for surgery.    NOTE: ALL Gastric, Bariatric & Bowel surgery patients - you MUST follow your surgeon's instructions regarding eating/ drinking as you will have very specific instructions to follow. If you did not receive these, call your surgeon's office immediately. 5. MEDICATIONS:  Take the following medications with a SMALL sip of water: NONE  Use your usual dose of inhalers the morning of surgery. BRING your rescue inhaler with you to hospital.   Anesthesia does NOT want you to take insulin the morning of surgery. They will control your blood sugar while you are at the hospital. Please contact your ordering physician for instructions regarding your insulin the night before your procedure. If you have an insulin pump, please keep it set on basal rate. Bariatric patient's call your surgeon if on diabetic medications as some may need to be stopped 1 week prior to surgery    6. Do not swallow additional water when brushing teeth. No gum, candy, mints, or ice chips. Refrain from smoking or at least decrease the amount on day of surgery. 7. Morning of surgery:   Take a shower with an antibacterial soap (i.e., Safeguard or Dial) OR your physician may have instructed you to use Hibiclens. Dress in loose, comfortable clothing appropriate for redressing after your procedure. Do not wear jewelry (including body piercings), make-up (especially NO eye make-up), fingernail polish (NO toenail polish if foot/leg surgery), lotion, powders, or metal hairclips. Do not shave or wax for 72 hours prior to procedure near your operative site. Shaving with a razor can irritate your skin and make it easier to develop an infection. On the day of your procedure, any hair that needs to be removed near the surgical site will be 'clipped' by a healthcare worker using a special clipper designed to avoid skin irritation. 8. Dentures, glasses, or contacts will need to be removed before your procedure. Bring cases for your glasses, contacts, dentures, or hearing aids to protect them while you are in surgery.       9. If you use a CPAP, please bring it with you on the day of your procedure. 10. We recommend that valuable personal belongings such as cash, cell phones, e-tablets, or jewelry, be left at home during your stay. The hospital will not be responsible for valuables that are not secured in the hospital safe. However, if your insurance requires a co-pay, you may want to bring a method of payment, i.e., Check or credit card, if you wish to pay your co-pay the day of surgery. 11. If you are to stay overnight, you may bring a bag with personal items. Please have any large items you may need brought in by your family after your arrival to your hospital room. 12. If you have a Living Will or Durable Power of , please bring a copy on the day of your procedure. How we keep you safe and work to prevent surgical site infections:   1. Health care workers should always check your ID bracelet to verify your name and birth date. You will be asked many times to state your name, date of birth, and allergies. 2. Health care workers should always clean their hands with soap or alcohol gel before providing care to you. It is okay to ask anyone if they cleaned their hands before they touch you. 3. You will be actively involved in verifying the type of procedure you are having and ensuring the correct surgical site. This will be confirmed multiple times prior to your procedure. Do NOT flakito your surgery site UNLESS instructed to by your surgeon. 4. When you are in the operating room, your surgical site will be cleansed with a special soap, and in most cases, you will be given an antibiotic before the surgery begins. What to expect AFTER your procedure? 1. Immediately following your procedure, your will be taken to the PACU for the first phase of your recovery. Your nurse will help you recover from any potential side effects of anesthesia, such as extreme drowsiness, changes in your vital signs or breathing patterns.  Nausea, headache, muscle aches, or sore throat may also occur after anesthesia. Your nurse will help you manage these potential side effects. 2. For comfort and safety, arrange to have someone at home with you for the first 24 hours after discharge. 3. You and your family will be given written instructions about your diet, activity, dressing care, medications, and return visits. 4. Once at home, should issues with nausea, pain, or bleeding occur, or should you notice any signs of infection, you should call your surgeon. 5. Always clean your hands before and after caring for your wound. Do not let your family touch your surgery site without cleaning their hands. 6. Narcotic pain medications can cause significant constipation. You may want to add a stool softener to your postoperative medication schedule or speak to your surgeon on how best to manage this SIDE EFFECT. SPECIAL INSTRUCTIONS BRING CPAP MACHINE;  PATIENT ACKNOWLEDGES UNDERSTANDING OF THIS ALONG WITH PRE OP INSTRUCTIONS. Thank you for allowing us to care for you. We strive to exceed your expectations in the delivery of care and service provided to you and your family. If you need to contact the [place] [address] staff for any reason, please call us at [phone]    Instructions reviewed with patient during preadmission testing phone interview.   Sharmaine Bhatt RN.1/19/2023 .3:59 PM      ADDITIONAL EDUCATIONAL INFORMATION REVIEWED PER PHONE WITH YOU AND/OR YOUR FAMILY:  Yes Hibiclens® Bathing Instructions

## 2023-01-19 NOTE — PROGRESS NOTES
Place patient label inside box (if no patient label, complete below)  Name:  :    MR#:   Yanna Lee / PROCEDURE  I (we), Yasmin Stanton (Patient Name) authorize Brianna Russell MD [address] MD  (Provider / Emily Dougherty) and/or such assistants as may be selected by him/her, to perform the following operation/procedure(s): L3-5 ANTERIOR LUMBAR INTERBODY FUSION WITH PEDICLE SCREW FIXATION       Note: If unable to obtain consent prior to an emergent procedure, document the emergent reason in the medical record. This procedure has been explained to my (our) satisfaction and included in the explanation was: The intended benefit, nature, and extent of the procedure to be performed; The significant risks involved and the probability of success; Alternative procedures and methods of treatment; The dangers and probable consequences of such alternatives (including no procedure or treatment); The expected consequences of the procedure on my future health; Whether other qualified individuals would be performing important surgical tasks and/or whether  would be present to advise or support the procedure. I (we) understand that there are other risks of infection and other serious complications in the pre-operative/procedural and postoperative/procedural stages of my (our) care. I (we) have asked all of the questions which I (we) thought were important in deciding whether or not to undergo treatment or diagnosis. These questions have been answered to my (our) satisfaction. I (we) understand that no assurance can be given that the procedure will be a success, and no guarantee or warranty of success has been given to me (us).     It has been explained to me (us) that during the course of the operation/procedure, unforeseen conditions may be revealed that necessitate extension of the original procedure(s) or different procedure(s) than those set forth in Paragraph 1. I (we) authorize and request that the above-named physician, his/her assistants or his/her designees, perform procedures as necessary and desirable if deemed to be in my (our) best interest.     Revised 8/2/2021                                                                          Page 1 of 2       I acknowledge that health care personnel may be observing this procedure for the purpose of medical education or other specified purposes as may be necessary as requested and/or approved by my (our) physician. I (we) consent to the disposal by the hospital Pathologist of the removed tissue, parts or organs in accordance with hospital policy. I do ____ do not ____ consent to the use of a local infiltration pain blocking agent that will be used by my provider/surgical provider to help alleviate pain during my procedure. I do ____ do not ____ consent to an emergent blood transfusion in the case of a life-threatening situation that requires blood components to be administered. This consent is valid for 24 hours from the beginning of the procedure. This patient does ____ or does not ____ currently have a DNR status/order. If DNR order is in place, obtain Addendum to the Surgical Consent for ALL Patients with a DNR Order to address kwesi-operative status for limited intervention or DNR suspension.      I have read and fully understand the above Consent for Operation/Procedure and that all blanks were completed before I signed the consent.   _____________________________       _____________________      ____/____am/pm  Signature of Patient or legal representative      Printed Name / Relationship            Date / Time   ____________________________       _____________________      ____/____am/pm  Witness to Signature                                    Printed Name                    Date / Time    If patient is unable to sign or is a minor, complete the following)  Patient is a minor, ____ years of age, or unable to sign because:   ______________________________________________________________________________________________    If a phone consent is obtained, consent will be documented by using two health care professionals, each affirming that the consenting party has no questions and gives consent for the procedure discussed with the physician/provider.   _____________________          ____________________       _____/_____am/pm   2nd witness to phone consent        Printed name           Date / Time    Informed Consent:  I have provided the explanation described above in section 1 to the patient and/or legal representative.  I have provided the patient and/or legal representative with an opportunity to ask any questions about the proposed operation/procedure.   ___________________________          ____________________         ____/____am/pm  Provider / Proceduralist                            Printed name            Date / Time  Revised 8/2/2021                                                                      Page 2 of 2

## 2023-01-23 ENCOUNTER — ANESTHESIA EVENT (OUTPATIENT)
Dept: OPERATING ROOM | Age: 53
DRG: 304 | End: 2023-01-23
Payer: COMMERCIAL

## 2023-01-24 ENCOUNTER — APPOINTMENT (OUTPATIENT)
Dept: GENERAL RADIOLOGY | Age: 53
DRG: 304 | End: 2023-01-24
Attending: NEUROLOGICAL SURGERY
Payer: COMMERCIAL

## 2023-01-24 ENCOUNTER — ANESTHESIA (OUTPATIENT)
Dept: OPERATING ROOM | Age: 53
DRG: 304 | End: 2023-01-24
Payer: COMMERCIAL

## 2023-01-24 ENCOUNTER — HOSPITAL ENCOUNTER (INPATIENT)
Age: 53
LOS: 1 days | Discharge: HOME | DRG: 304 | End: 2023-01-25
Attending: NEUROLOGICAL SURGERY | Admitting: NEUROLOGICAL SURGERY
Payer: COMMERCIAL

## 2023-01-24 DIAGNOSIS — Z98.1 S/P LUMBAR FUSION: Primary | ICD-10-CM

## 2023-01-24 PROBLEM — M43.16 SPONDYLOLISTHESIS OF LUMBAR REGION: Status: ACTIVE | Noted: 2023-01-24

## 2023-01-24 LAB
ABO/RH: NORMAL
ANTIBODY SCREEN: NORMAL
APTT: 26.9 SEC (ref 23–34.3)
BASE EXCESS ARTERIAL: 0 (ref -3–3)
GLUCOSE BLD-MCNC: 128 MG/DL (ref 70–99)
GLUCOSE BLD-MCNC: 150 MG/DL (ref 70–99)
GLUCOSE BLD-MCNC: 156 MG/DL (ref 70–99)
HCO3 ARTERIAL: 25.5 MMOL/L (ref 21–29)
INR BLD: 1.15 (ref 0.87–1.14)
O2 SAT, ARTERIAL: 97 % (ref 93–100)
PCO2 ARTERIAL: 45.3 MM HG (ref 35–45)
PERFORMED ON: ABNORMAL
PH ARTERIAL: 7.36 (ref 7.35–7.45)
PO2 ARTERIAL: 96.4 MM HG (ref 75–108)
POC SAMPLE TYPE: ABNORMAL
PROTHROMBIN TIME: 14.6 SEC (ref 11.7–14.5)
TCO2 ARTERIAL: 27 MMOL/L

## 2023-01-24 PROCEDURE — 99222 1ST HOSP IP/OBS MODERATE 55: CPT | Performed by: INTERNAL MEDICINE

## 2023-01-24 PROCEDURE — 6360000002 HC RX W HCPCS: Performed by: PHYSICIAN ASSISTANT

## 2023-01-24 PROCEDURE — 2500000003 HC RX 250 WO HCPCS: Performed by: FAMILY MEDICINE

## 2023-01-24 PROCEDURE — 0SG1071 FUSION OF 2 OR MORE LUMBAR VERTEBRAL JOINTS WITH AUTOLOGOUS TISSUE SUBSTITUTE, POSTERIOR APPROACH, POSTERIOR COLUMN, OPEN APPROACH: ICD-10-PCS | Performed by: NEUROLOGICAL SURGERY

## 2023-01-24 PROCEDURE — 0ST20ZZ RESECTION OF LUMBAR VERTEBRAL DISC, OPEN APPROACH: ICD-10-PCS | Performed by: NEUROLOGICAL SURGERY

## 2023-01-24 PROCEDURE — 7100000000 HC PACU RECOVERY - FIRST 15 MIN: Performed by: NEUROLOGICAL SURGERY

## 2023-01-24 PROCEDURE — 2500000003 HC RX 250 WO HCPCS: Performed by: NURSE ANESTHETIST, CERTIFIED REGISTERED

## 2023-01-24 PROCEDURE — 2580000003 HC RX 258: Performed by: PHYSICIAN ASSISTANT

## 2023-01-24 PROCEDURE — 2709999900 HC NON-CHARGEABLE SUPPLY: Performed by: NEUROLOGICAL SURGERY

## 2023-01-24 PROCEDURE — 6360000002 HC RX W HCPCS: Performed by: NEUROLOGICAL SURGERY

## 2023-01-24 PROCEDURE — 82803 BLOOD GASES ANY COMBINATION: CPT

## 2023-01-24 PROCEDURE — 86850 RBC ANTIBODY SCREEN: CPT

## 2023-01-24 PROCEDURE — 2700000000 HC OXYGEN THERAPY PER DAY

## 2023-01-24 PROCEDURE — 0SG10A0 FUSION OF 2 OR MORE LUMBAR VERTEBRAL JOINTS WITH INTERBODY FUSION DEVICE, ANTERIOR APPROACH, ANTERIOR COLUMN, OPEN APPROACH: ICD-10-PCS | Performed by: NEUROLOGICAL SURGERY

## 2023-01-24 PROCEDURE — 85610 PROTHROMBIN TIME: CPT

## 2023-01-24 PROCEDURE — 3600000004 HC SURGERY LEVEL 4 BASE: Performed by: NEUROLOGICAL SURGERY

## 2023-01-24 PROCEDURE — 3600000014 HC SURGERY LEVEL 4 ADDTL 15MIN: Performed by: NEUROLOGICAL SURGERY

## 2023-01-24 PROCEDURE — 2580000003 HC RX 258: Performed by: INTERNAL MEDICINE

## 2023-01-24 PROCEDURE — 94761 N-INVAS EAR/PLS OXIMETRY MLT: CPT

## 2023-01-24 PROCEDURE — A4217 STERILE WATER/SALINE, 500 ML: HCPCS | Performed by: NEUROLOGICAL SURGERY

## 2023-01-24 PROCEDURE — 86900 BLOOD TYPING SEROLOGIC ABO: CPT

## 2023-01-24 PROCEDURE — 6370000000 HC RX 637 (ALT 250 FOR IP): Performed by: PHYSICIAN ASSISTANT

## 2023-01-24 PROCEDURE — 6360000002 HC RX W HCPCS

## 2023-01-24 PROCEDURE — C1762 CONN TISS, HUMAN(INC FASCIA): HCPCS | Performed by: NEUROLOGICAL SURGERY

## 2023-01-24 PROCEDURE — 2580000003 HC RX 258: Performed by: NURSE ANESTHETIST, CERTIFIED REGISTERED

## 2023-01-24 PROCEDURE — 6370000000 HC RX 637 (ALT 250 FOR IP): Performed by: NEUROLOGICAL SURGERY

## 2023-01-24 PROCEDURE — 2000000000 HC ICU R&B

## 2023-01-24 PROCEDURE — 86901 BLOOD TYPING SEROLOGIC RH(D): CPT

## 2023-01-24 PROCEDURE — 3700000001 HC ADD 15 MINUTES (ANESTHESIA): Performed by: NEUROLOGICAL SURGERY

## 2023-01-24 PROCEDURE — 2500000003 HC RX 250 WO HCPCS: Performed by: INTERNAL MEDICINE

## 2023-01-24 PROCEDURE — 2720000010 HC SURG SUPPLY STERILE: Performed by: NEUROLOGICAL SURGERY

## 2023-01-24 PROCEDURE — 36600 WITHDRAWAL OF ARTERIAL BLOOD: CPT

## 2023-01-24 PROCEDURE — 3E0U0GB INTRODUCTION OF RECOMBINANT BONE MORPHOGENETIC PROTEIN INTO JOINTS, OPEN APPROACH: ICD-10-PCS | Performed by: NEUROLOGICAL SURGERY

## 2023-01-24 PROCEDURE — 6360000002 HC RX W HCPCS: Performed by: ANESTHESIOLOGY

## 2023-01-24 PROCEDURE — 72100 X-RAY EXAM L-S SPINE 2/3 VWS: CPT

## 2023-01-24 PROCEDURE — 85730 THROMBOPLASTIN TIME PARTIAL: CPT

## 2023-01-24 PROCEDURE — 2580000003 HC RX 258: Performed by: NEUROLOGICAL SURGERY

## 2023-01-24 PROCEDURE — 2500000003 HC RX 250 WO HCPCS: Performed by: NEUROLOGICAL SURGERY

## 2023-01-24 PROCEDURE — 7100000001 HC PACU RECOVERY - ADDTL 15 MIN: Performed by: NEUROLOGICAL SURGERY

## 2023-01-24 PROCEDURE — 3209999900 FLUORO FOR SURGICAL PROCEDURES

## 2023-01-24 PROCEDURE — C1713 ANCHOR/SCREW BN/BN,TIS/BN: HCPCS | Performed by: NEUROLOGICAL SURGERY

## 2023-01-24 PROCEDURE — C9290 INJ, BUPIVACAINE LIPOSOME: HCPCS | Performed by: NEUROLOGICAL SURGERY

## 2023-01-24 PROCEDURE — 6360000002 HC RX W HCPCS: Performed by: FAMILY MEDICINE

## 2023-01-24 PROCEDURE — 6360000002 HC RX W HCPCS: Performed by: NURSE ANESTHETIST, CERTIFIED REGISTERED

## 2023-01-24 PROCEDURE — 3700000000 HC ANESTHESIA ATTENDED CARE: Performed by: NEUROLOGICAL SURGERY

## 2023-01-24 PROCEDURE — 2580000003 HC RX 258: Performed by: FAMILY MEDICINE

## 2023-01-24 PROCEDURE — C1889 IMPLANT/INSERT DEVICE, NOC: HCPCS | Performed by: NEUROLOGICAL SURGERY

## 2023-01-24 DEVICE — IDENTITI ALIF SA GRAFT BOLT, Ø8.5 X 25 MM
Type: IMPLANTABLE DEVICE | Site: SPINE LUMBAR | Status: FUNCTIONAL
Brand: IDENTITI

## 2023-01-24 DEVICE — IDENTITI ALIF SA SPACER, 10 X 34 X 24 MM, 15°
Type: IMPLANTABLE DEVICE | Site: SPINE LUMBAR | Status: FUNCTIONAL
Brand: IDENTITI

## 2023-01-24 DEVICE — IDENTITI ALIF SA SPACER, 10 X 34 X 24 MM, 10°
Type: IMPLANTABLE DEVICE | Site: SPINE LUMBAR | Status: FUNCTIONAL
Brand: IDENTITI

## 2023-01-24 DEVICE — IDENTITI ALIF SA CENTER SCREW, Ø6 X 25 MM
Type: IMPLANTABLE DEVICE | Site: SPINE LUMBAR | Status: FUNCTIONAL
Brand: IDENTITI

## 2023-01-24 DEVICE — BONE GRAFT KIT 7510200 INFUSE SMALL
Type: IMPLANTABLE DEVICE | Site: SPINE LUMBAR | Status: FUNCTIONAL
Brand: INFUSE® BONE GRAFT

## 2023-01-24 DEVICE — IDENTITI ALIF SA LATERAL SCREW, Ø6 X 25 MM
Type: IMPLANTABLE DEVICE | Site: SPINE LUMBAR | Status: FUNCTIONAL
Brand: IDENTITI

## 2023-01-24 RX ORDER — FENTANYL CITRATE 50 UG/ML
INJECTION, SOLUTION INTRAMUSCULAR; INTRAVENOUS PRN
Status: DISCONTINUED | OUTPATIENT
Start: 2023-01-24 | End: 2023-01-24 | Stop reason: SDUPTHER

## 2023-01-24 RX ORDER — SODIUM CHLORIDE AND POTASSIUM CHLORIDE 150; 900 MG/100ML; MG/100ML
INJECTION, SOLUTION INTRAVENOUS CONTINUOUS
Status: DISCONTINUED | OUTPATIENT
Start: 2023-01-24 | End: 2023-01-25

## 2023-01-24 RX ORDER — GLYCOPYRROLATE 1 MG/5 ML
SYRINGE (ML) INTRAVENOUS PRN
Status: DISCONTINUED | OUTPATIENT
Start: 2023-01-24 | End: 2023-01-24 | Stop reason: SDUPTHER

## 2023-01-24 RX ORDER — GLUCAGON 1 MG/ML
1 KIT INJECTION PRN
Status: DISCONTINUED | OUTPATIENT
Start: 2023-01-24 | End: 2023-01-25 | Stop reason: HOSPADM

## 2023-01-24 RX ORDER — FENTANYL CITRATE 50 UG/ML
25 INJECTION, SOLUTION INTRAMUSCULAR; INTRAVENOUS EVERY 5 MIN PRN
Status: COMPLETED | OUTPATIENT
Start: 2023-01-24 | End: 2023-01-24

## 2023-01-24 RX ORDER — PROPOFOL 10 MG/ML
INJECTION, EMULSION INTRAVENOUS CONTINUOUS PRN
Status: DISCONTINUED | OUTPATIENT
Start: 2023-01-24 | End: 2023-01-24 | Stop reason: SDUPTHER

## 2023-01-24 RX ORDER — SODIUM CHLORIDE 0.9 % (FLUSH) 0.9 %
5-40 SYRINGE (ML) INJECTION EVERY 12 HOURS SCHEDULED
Status: DISCONTINUED | OUTPATIENT
Start: 2023-01-24 | End: 2023-01-25 | Stop reason: HOSPADM

## 2023-01-24 RX ORDER — SODIUM CHLORIDE 0.9 % (FLUSH) 0.9 %
5-40 SYRINGE (ML) INJECTION EVERY 12 HOURS SCHEDULED
Status: DISCONTINUED | OUTPATIENT
Start: 2023-01-24 | End: 2023-01-24 | Stop reason: HOSPADM

## 2023-01-24 RX ORDER — INSULIN LISPRO 100 [IU]/ML
0-4 INJECTION, SOLUTION INTRAVENOUS; SUBCUTANEOUS NIGHTLY
Status: DISCONTINUED | OUTPATIENT
Start: 2023-01-24 | End: 2023-01-25 | Stop reason: HOSPADM

## 2023-01-24 RX ORDER — ATORVASTATIN CALCIUM 40 MG/1
40 TABLET, FILM COATED ORAL DAILY
Status: DISCONTINUED | OUTPATIENT
Start: 2023-01-24 | End: 2023-01-25 | Stop reason: HOSPADM

## 2023-01-24 RX ORDER — DEXTROSE MONOHYDRATE 100 MG/ML
INJECTION, SOLUTION INTRAVENOUS CONTINUOUS PRN
Status: DISCONTINUED | OUTPATIENT
Start: 2023-01-24 | End: 2023-01-25 | Stop reason: HOSPADM

## 2023-01-24 RX ORDER — PROCHLORPERAZINE EDISYLATE 5 MG/ML
5 INJECTION INTRAMUSCULAR; INTRAVENOUS
Status: DISCONTINUED | OUTPATIENT
Start: 2023-01-24 | End: 2023-01-24 | Stop reason: HOSPADM

## 2023-01-24 RX ORDER — POLYETHYLENE GLYCOL 3350 17 G/17G
17 POWDER, FOR SOLUTION ORAL DAILY
Status: DISCONTINUED | OUTPATIENT
Start: 2023-01-24 | End: 2023-01-25 | Stop reason: HOSPADM

## 2023-01-24 RX ORDER — INSULIN LISPRO 100 [IU]/ML
0-4 INJECTION, SOLUTION INTRAVENOUS; SUBCUTANEOUS
Status: DISCONTINUED | OUTPATIENT
Start: 2023-01-24 | End: 2023-01-25 | Stop reason: HOSPADM

## 2023-01-24 RX ORDER — HALOPERIDOL 5 MG/ML
5 INJECTION INTRAMUSCULAR ONCE
Status: COMPLETED | OUTPATIENT
Start: 2023-01-24 | End: 2023-01-24

## 2023-01-24 RX ORDER — ONDANSETRON 2 MG/ML
INJECTION INTRAMUSCULAR; INTRAVENOUS PRN
Status: DISCONTINUED | OUTPATIENT
Start: 2023-01-24 | End: 2023-01-24 | Stop reason: SDUPTHER

## 2023-01-24 RX ORDER — LOSARTAN POTASSIUM 100 MG/1
100 TABLET ORAL DAILY
Status: DISCONTINUED | OUTPATIENT
Start: 2023-01-24 | End: 2023-01-25 | Stop reason: HOSPADM

## 2023-01-24 RX ORDER — LIDOCAINE HYDROCHLORIDE 20 MG/ML
INJECTION, SOLUTION INTRAVENOUS PRN
Status: DISCONTINUED | OUTPATIENT
Start: 2023-01-24 | End: 2023-01-24 | Stop reason: SDUPTHER

## 2023-01-24 RX ORDER — IPRATROPIUM BROMIDE AND ALBUTEROL SULFATE 2.5; .5 MG/3ML; MG/3ML
1 SOLUTION RESPIRATORY (INHALATION)
Status: DISCONTINUED | OUTPATIENT
Start: 2023-01-24 | End: 2023-01-24 | Stop reason: HOSPADM

## 2023-01-24 RX ORDER — ONDANSETRON 4 MG/1
4 TABLET, ORALLY DISINTEGRATING ORAL EVERY 8 HOURS PRN
Status: DISCONTINUED | OUTPATIENT
Start: 2023-01-24 | End: 2023-01-25 | Stop reason: HOSPADM

## 2023-01-24 RX ORDER — ACETAMINOPHEN 325 MG/1
650 TABLET ORAL
Status: DISCONTINUED | OUTPATIENT
Start: 2023-01-24 | End: 2023-01-24 | Stop reason: HOSPADM

## 2023-01-24 RX ORDER — REMIFENTANIL HYDROCHLORIDE 1 MG/ML
INJECTION, POWDER, LYOPHILIZED, FOR SOLUTION INTRAVENOUS CONTINUOUS PRN
Status: DISCONTINUED | OUTPATIENT
Start: 2023-01-24 | End: 2023-01-24 | Stop reason: SDUPTHER

## 2023-01-24 RX ORDER — MIDAZOLAM HYDROCHLORIDE 1 MG/ML
INJECTION INTRAMUSCULAR; INTRAVENOUS
Status: COMPLETED
Start: 2023-01-24 | End: 2023-01-24

## 2023-01-24 RX ORDER — SODIUM CHLORIDE 9 MG/ML
INJECTION, SOLUTION INTRAVENOUS PRN
Status: DISCONTINUED | OUTPATIENT
Start: 2023-01-24 | End: 2023-01-24 | Stop reason: HOSPADM

## 2023-01-24 RX ORDER — ONDANSETRON 2 MG/ML
4 INJECTION INTRAMUSCULAR; INTRAVENOUS EVERY 6 HOURS PRN
Status: DISCONTINUED | OUTPATIENT
Start: 2023-01-24 | End: 2023-01-25 | Stop reason: HOSPADM

## 2023-01-24 RX ORDER — SODIUM CHLORIDE 0.9 % (FLUSH) 0.9 %
5-40 SYRINGE (ML) INJECTION PRN
Status: DISCONTINUED | OUTPATIENT
Start: 2023-01-24 | End: 2023-01-24 | Stop reason: HOSPADM

## 2023-01-24 RX ORDER — VANCOMYCIN HYDROCHLORIDE 1 G/20ML
INJECTION, POWDER, LYOPHILIZED, FOR SOLUTION INTRAVENOUS PRN
Status: DISCONTINUED | OUTPATIENT
Start: 2023-01-24 | End: 2023-01-24 | Stop reason: HOSPADM

## 2023-01-24 RX ORDER — BISACODYL 10 MG
10 SUPPOSITORY, RECTAL RECTAL DAILY PRN
Status: DISCONTINUED | OUTPATIENT
Start: 2023-01-24 | End: 2023-01-25 | Stop reason: HOSPADM

## 2023-01-24 RX ORDER — ACYCLOVIR 400 MG/1
400 TABLET ORAL 2 TIMES DAILY
Status: DISCONTINUED | OUTPATIENT
Start: 2023-01-24 | End: 2023-01-25 | Stop reason: HOSPADM

## 2023-01-24 RX ORDER — SODIUM CHLORIDE, SODIUM LACTATE, POTASSIUM CHLORIDE, CALCIUM CHLORIDE 600; 310; 30; 20 MG/100ML; MG/100ML; MG/100ML; MG/100ML
INJECTION, SOLUTION INTRAVENOUS CONTINUOUS PRN
Status: DISCONTINUED | OUTPATIENT
Start: 2023-01-24 | End: 2023-01-24 | Stop reason: SDUPTHER

## 2023-01-24 RX ORDER — OXYCODONE HYDROCHLORIDE 5 MG/1
10 TABLET ORAL EVERY 4 HOURS PRN
Status: DISCONTINUED | OUTPATIENT
Start: 2023-01-24 | End: 2023-01-25 | Stop reason: HOSPADM

## 2023-01-24 RX ORDER — OXYCODONE HYDROCHLORIDE 5 MG/1
5 TABLET ORAL EVERY 4 HOURS PRN
Status: DISCONTINUED | OUTPATIENT
Start: 2023-01-24 | End: 2023-01-25 | Stop reason: HOSPADM

## 2023-01-24 RX ORDER — PHENYLEPHRINE HYDROCHLORIDE 10 MG/ML
INJECTION INTRAVENOUS PRN
Status: DISCONTINUED | OUTPATIENT
Start: 2023-01-24 | End: 2023-01-24 | Stop reason: SDUPTHER

## 2023-01-24 RX ORDER — MIDAZOLAM HYDROCHLORIDE 1 MG/ML
INJECTION INTRAMUSCULAR; INTRAVENOUS PRN
Status: DISCONTINUED | OUTPATIENT
Start: 2023-01-24 | End: 2023-01-24 | Stop reason: SDUPTHER

## 2023-01-24 RX ORDER — SUCCINYLCHOLINE/SOD CL,ISO/PF 200MG/10ML
SYRINGE (ML) INTRAVENOUS PRN
Status: DISCONTINUED | OUTPATIENT
Start: 2023-01-24 | End: 2023-01-24 | Stop reason: SDUPTHER

## 2023-01-24 RX ORDER — HYDROMORPHONE HCL 110MG/55ML
PATIENT CONTROLLED ANALGESIA SYRINGE INTRAVENOUS PRN
Status: DISCONTINUED | OUTPATIENT
Start: 2023-01-24 | End: 2023-01-24 | Stop reason: SDUPTHER

## 2023-01-24 RX ORDER — METOPROLOL TARTRATE 50 MG/1
TABLET, FILM COATED ORAL
COMMUNITY
Start: 2023-01-17

## 2023-01-24 RX ORDER — MORPHINE SULFATE 2 MG/ML
2 INJECTION, SOLUTION INTRAMUSCULAR; INTRAVENOUS
Status: DISCONTINUED | OUTPATIENT
Start: 2023-01-24 | End: 2023-01-25 | Stop reason: HOSPADM

## 2023-01-24 RX ORDER — SODIUM CHLORIDE, SODIUM LACTATE, POTASSIUM CHLORIDE, CALCIUM CHLORIDE 600; 310; 30; 20 MG/100ML; MG/100ML; MG/100ML; MG/100ML
INJECTION, SOLUTION INTRAVENOUS CONTINUOUS
Status: DISCONTINUED | OUTPATIENT
Start: 2023-01-24 | End: 2023-01-24 | Stop reason: HOSPADM

## 2023-01-24 RX ORDER — ACETAMINOPHEN 325 MG/1
650 TABLET ORAL EVERY 6 HOURS
Status: DISCONTINUED | OUTPATIENT
Start: 2023-01-24 | End: 2023-01-25 | Stop reason: HOSPADM

## 2023-01-24 RX ORDER — EPHEDRINE SULFATE 50 MG/ML
INJECTION INTRAVENOUS PRN
Status: DISCONTINUED | OUTPATIENT
Start: 2023-01-24 | End: 2023-01-24 | Stop reason: SDUPTHER

## 2023-01-24 RX ORDER — ROCURONIUM BROMIDE 10 MG/ML
INJECTION, SOLUTION INTRAVENOUS PRN
Status: DISCONTINUED | OUTPATIENT
Start: 2023-01-24 | End: 2023-01-24 | Stop reason: SDUPTHER

## 2023-01-24 RX ORDER — SODIUM CHLORIDE 9 MG/ML
INJECTION, SOLUTION INTRAVENOUS PRN
Status: DISCONTINUED | OUTPATIENT
Start: 2023-01-24 | End: 2023-01-25 | Stop reason: HOSPADM

## 2023-01-24 RX ORDER — MORPHINE SULFATE 4 MG/ML
4 INJECTION, SOLUTION INTRAMUSCULAR; INTRAVENOUS
Status: DISCONTINUED | OUTPATIENT
Start: 2023-01-24 | End: 2023-01-25 | Stop reason: HOSPADM

## 2023-01-24 RX ORDER — ONDANSETRON 2 MG/ML
4 INJECTION INTRAMUSCULAR; INTRAVENOUS
Status: DISCONTINUED | OUTPATIENT
Start: 2023-01-24 | End: 2023-01-24 | Stop reason: HOSPADM

## 2023-01-24 RX ORDER — METHOCARBAMOL 750 MG/1
750 TABLET, FILM COATED ORAL EVERY 8 HOURS PRN
Status: DISCONTINUED | OUTPATIENT
Start: 2023-01-24 | End: 2023-01-25 | Stop reason: HOSPADM

## 2023-01-24 RX ORDER — SODIUM CHLORIDE 0.9 % (FLUSH) 0.9 %
5-40 SYRINGE (ML) INJECTION PRN
Status: DISCONTINUED | OUTPATIENT
Start: 2023-01-24 | End: 2023-01-25 | Stop reason: HOSPADM

## 2023-01-24 RX ORDER — KETAMINE HCL IN NACL, ISO-OSM 20 MG/2 ML
20 SYRINGE (ML) INJECTION ONCE
Status: COMPLETED | OUTPATIENT
Start: 2023-01-24 | End: 2023-01-24

## 2023-01-24 RX ORDER — DEXAMETHASONE SODIUM PHOSPHATE 4 MG/ML
INJECTION, SOLUTION INTRA-ARTICULAR; INTRALESIONAL; INTRAMUSCULAR; INTRAVENOUS; SOFT TISSUE PRN
Status: DISCONTINUED | OUTPATIENT
Start: 2023-01-24 | End: 2023-01-24 | Stop reason: SDUPTHER

## 2023-01-24 RX ORDER — PROPOFOL 10 MG/ML
INJECTION, EMULSION INTRAVENOUS PRN
Status: DISCONTINUED | OUTPATIENT
Start: 2023-01-24 | End: 2023-01-24 | Stop reason: SDUPTHER

## 2023-01-24 RX ORDER — LABETALOL HYDROCHLORIDE 5 MG/ML
10 INJECTION, SOLUTION INTRAVENOUS
Status: DISCONTINUED | OUTPATIENT
Start: 2023-01-24 | End: 2023-01-24 | Stop reason: HOSPADM

## 2023-01-24 RX ADMIN — Medication 160 MG: at 10:32

## 2023-01-24 RX ADMIN — CEFAZOLIN 3000 MG: 10 INJECTION, POWDER, FOR SOLUTION INTRAVENOUS at 20:05

## 2023-01-24 RX ADMIN — PHENYLEPHRINE HYDROCHLORIDE 100 MCG: 10 INJECTION INTRAVENOUS at 11:53

## 2023-01-24 RX ADMIN — ROCURONIUM BROMIDE 50 MG: 10 INJECTION INTRAVENOUS at 10:51

## 2023-01-24 RX ADMIN — FENTANYL CITRATE 25 MCG: 50 INJECTION, SOLUTION INTRAMUSCULAR; INTRAVENOUS at 15:58

## 2023-01-24 RX ADMIN — Medication 0.2 MG: at 11:53

## 2023-01-24 RX ADMIN — HALOPERIDOL LACTATE 5 MG: 5 INJECTION, SOLUTION INTRAMUSCULAR at 15:42

## 2023-01-24 RX ADMIN — ROCURONIUM BROMIDE 30 MG: 10 INJECTION INTRAVENOUS at 11:31

## 2023-01-24 RX ADMIN — DEXAMETHASONE SODIUM PHOSPHATE 8 MG: 4 INJECTION, SOLUTION INTRAMUSCULAR; INTRAVENOUS at 10:40

## 2023-01-24 RX ADMIN — HYDROMORPHONE HYDROCHLORIDE 1 MG: 2 INJECTION, SOLUTION INTRAMUSCULAR; INTRAVENOUS; SUBCUTANEOUS at 11:07

## 2023-01-24 RX ADMIN — FENTANYL CITRATE 50 MCG: 50 INJECTION, SOLUTION INTRAMUSCULAR; INTRAVENOUS at 11:53

## 2023-01-24 RX ADMIN — SODIUM CHLORIDE, SODIUM LACTATE, POTASSIUM CHLORIDE, AND CALCIUM CHLORIDE: .6; .31; .03; .02 INJECTION, SOLUTION INTRAVENOUS at 10:26

## 2023-01-24 RX ADMIN — ATORVASTATIN CALCIUM 40 MG: 40 TABLET, FILM COATED ORAL at 18:54

## 2023-01-24 RX ADMIN — DEXMEDETOMIDINE 0.2 MCG/KG/HR: 100 INJECTION, SOLUTION INTRAVENOUS at 16:53

## 2023-01-24 RX ADMIN — PHENYLEPHRINE HYDROCHLORIDE 150 MCG: 10 INJECTION INTRAVENOUS at 11:26

## 2023-01-24 RX ADMIN — ACYCLOVIR 400 MG: 400 TABLET ORAL at 20:24

## 2023-01-24 RX ADMIN — SODIUM CHLORIDE, POTASSIUM CHLORIDE, SODIUM LACTATE AND CALCIUM CHLORIDE: 600; 310; 30; 20 INJECTION, SOLUTION INTRAVENOUS at 10:00

## 2023-01-24 RX ADMIN — ONDANSETRON 4 MG: 2 INJECTION INTRAMUSCULAR; INTRAVENOUS at 13:52

## 2023-01-24 RX ADMIN — SODIUM CHLORIDE, POTASSIUM CHLORIDE, SODIUM LACTATE AND CALCIUM CHLORIDE: 600; 310; 30; 20 INJECTION, SOLUTION INTRAVENOUS at 11:43

## 2023-01-24 RX ADMIN — LOSARTAN POTASSIUM 100 MG: 100 TABLET, FILM COATED ORAL at 18:54

## 2023-01-24 RX ADMIN — Medication 20 MG: at 20:27

## 2023-01-24 RX ADMIN — METFORMIN HYDROCHLORIDE 500 MG: 500 TABLET ORAL at 18:54

## 2023-01-24 RX ADMIN — CEFAZOLIN 2000 MG: 2 INJECTION, POWDER, FOR SOLUTION INTRAMUSCULAR; INTRAVENOUS at 10:41

## 2023-01-24 RX ADMIN — POTASSIUM CHLORIDE AND SODIUM CHLORIDE: 900; 150 INJECTION, SOLUTION INTRAVENOUS at 18:14

## 2023-01-24 RX ADMIN — MORPHINE SULFATE 4 MG: 4 INJECTION INTRAVENOUS at 22:30

## 2023-01-24 RX ADMIN — FENTANYL CITRATE 25 MCG: 50 INJECTION, SOLUTION INTRAMUSCULAR; INTRAVENOUS at 15:45

## 2023-01-24 RX ADMIN — LIDOCAINE HYDROCHLORIDE 100 MG: 20 INJECTION, SOLUTION INTRAVENOUS at 10:32

## 2023-01-24 RX ADMIN — MORPHINE SULFATE 4 MG: 4 INJECTION INTRAVENOUS at 18:25

## 2023-01-24 RX ADMIN — FENTANYL CITRATE 50 MCG: 50 INJECTION, SOLUTION INTRAMUSCULAR; INTRAVENOUS at 10:32

## 2023-01-24 RX ADMIN — ACETAMINOPHEN 650 MG: 325 TABLET ORAL at 18:54

## 2023-01-24 RX ADMIN — METHOCARBAMOL 1000 MG: 100 INJECTION, SOLUTION INTRAMUSCULAR; INTRAVENOUS at 11:25

## 2023-01-24 RX ADMIN — HYDROMORPHONE HYDROCHLORIDE 0.5 MG: 1 INJECTION, SOLUTION INTRAMUSCULAR; INTRAVENOUS; SUBCUTANEOUS at 14:36

## 2023-01-24 RX ADMIN — Medication 0.2 MG: at 10:50

## 2023-01-24 RX ADMIN — HALOPERIDOL LACTATE 5 MG: 5 INJECTION, SOLUTION INTRAMUSCULAR at 15:10

## 2023-01-24 RX ADMIN — HYDROMORPHONE HYDROCHLORIDE 0.5 MG: 1 INJECTION, SOLUTION INTRAMUSCULAR; INTRAVENOUS; SUBCUTANEOUS at 14:45

## 2023-01-24 RX ADMIN — FENTANYL CITRATE 25 MCG: 50 INJECTION, SOLUTION INTRAMUSCULAR; INTRAVENOUS at 15:40

## 2023-01-24 RX ADMIN — FENTANYL CITRATE 25 MCG: 50 INJECTION, SOLUTION INTRAMUSCULAR; INTRAVENOUS at 15:50

## 2023-01-24 RX ADMIN — MIDAZOLAM HYDROCHLORIDE 2 MG: 2 INJECTION, SOLUTION INTRAMUSCULAR; INTRAVENOUS at 10:27

## 2023-01-24 RX ADMIN — PHENYLEPHRINE HYDROCHLORIDE 15 MCG/MIN: 10 INJECTION INTRAVENOUS at 11:00

## 2023-01-24 RX ADMIN — PHENYLEPHRINE HYDROCHLORIDE 150 MCG: 10 INJECTION INTRAVENOUS at 11:02

## 2023-01-24 RX ADMIN — Medication 10 ML: at 20:16

## 2023-01-24 RX ADMIN — MIDAZOLAM 2 MG: 1 INJECTION, SOLUTION INTRAMUSCULAR; INTRAVENOUS at 14:47

## 2023-01-24 RX ADMIN — PROPOFOL 140 MCG/KG/MIN: 10 INJECTION, EMULSION INTRAVENOUS at 10:32

## 2023-01-24 RX ADMIN — SUGAMMADEX 300 MG: 100 INJECTION, SOLUTION INTRAVENOUS at 13:19

## 2023-01-24 RX ADMIN — OXYCODONE HYDROCHLORIDE 10 MG: 5 TABLET ORAL at 20:23

## 2023-01-24 RX ADMIN — HYDROMORPHONE HYDROCHLORIDE 1 MG: 2 INJECTION, SOLUTION INTRAMUSCULAR; INTRAVENOUS; SUBCUTANEOUS at 11:48

## 2023-01-24 RX ADMIN — PROPOFOL 150 MG: 10 INJECTION, EMULSION INTRAVENOUS at 10:32

## 2023-01-24 RX ADMIN — SODIUM CHLORIDE, SODIUM LACTATE, POTASSIUM CHLORIDE, AND CALCIUM CHLORIDE: .6; .31; .03; .02 INJECTION, SOLUTION INTRAVENOUS at 13:15

## 2023-01-24 RX ADMIN — POLYETHYLENE GLYCOL 3350 17 G: 17 POWDER, FOR SOLUTION ORAL at 18:55

## 2023-01-24 RX ADMIN — REMIFENTANIL HYDROCHLORIDE 0.12 MCG/KG/MIN: 1 INJECTION, POWDER, LYOPHILIZED, FOR SOLUTION INTRAVENOUS at 10:32

## 2023-01-24 RX ADMIN — EPHEDRINE SULFATE 25 MG: 50 INJECTION INTRAVENOUS at 12:03

## 2023-01-24 RX ADMIN — METHOCARBAMOL 1000 MG: 100 INJECTION, SOLUTION INTRAMUSCULAR; INTRAVENOUS at 15:47

## 2023-01-24 RX ADMIN — ROCURONIUM BROMIDE 50 MG: 10 INJECTION INTRAVENOUS at 10:47

## 2023-01-24 RX ADMIN — ROCURONIUM BROMIDE 20 MG: 10 INJECTION INTRAVENOUS at 12:10

## 2023-01-24 RX ADMIN — PROPOFOL 100 MG: 10 INJECTION, EMULSION INTRAVENOUS at 12:13

## 2023-01-24 ASSESSMENT — PAIN DESCRIPTION - ORIENTATION
ORIENTATION: LOWER
ORIENTATION: MID

## 2023-01-24 ASSESSMENT — ENCOUNTER SYMPTOMS
COUGH: 0
CHEST TIGHTNESS: 0
ABDOMINAL PAIN: 1
SHORTNESS OF BREATH: 0
ABDOMINAL DISTENTION: 0
NAUSEA: 0
VOMITING: 0
BACK PAIN: 1
DIARRHEA: 0
CONSTIPATION: 0

## 2023-01-24 ASSESSMENT — PAIN SCALES - GENERAL
PAINLEVEL_OUTOF10: 10
PAINLEVEL_OUTOF10: 6
PAINLEVEL_OUTOF10: 10

## 2023-01-24 ASSESSMENT — PAIN DESCRIPTION - PAIN TYPE
TYPE: SURGICAL PAIN

## 2023-01-24 ASSESSMENT — PAIN DESCRIPTION - FREQUENCY
FREQUENCY: CONTINUOUS
FREQUENCY: CONTINUOUS

## 2023-01-24 ASSESSMENT — PAIN DESCRIPTION - DESCRIPTORS
DESCRIPTORS: ACHING

## 2023-01-24 ASSESSMENT — PAIN DESCRIPTION - LOCATION
LOCATION: BACK

## 2023-01-24 ASSESSMENT — PAIN DESCRIPTION - ONSET
ONSET: ON-GOING
ONSET: ON-GOING

## 2023-01-24 ASSESSMENT — PAIN - FUNCTIONAL ASSESSMENT: PAIN_FUNCTIONAL_ASSESSMENT: 0-10

## 2023-01-24 NOTE — PROGRESS NOTES
4 Eyes Admission Assessment     I agree as the admission nurse that 2 RN's have performed a thorough Head to Toe Skin Assessment on the patient. ALL assessment sites listed below have been assessed on admission. Areas assessed by both nurses:   [x]   Head, Face, and Ears   [x]   Shoulders, Back, and Chest  [x]   Arms, Elbows, and Hands   [x]   Coccyx, Sacrum, and Ischium  [x]   Legs, Feet, and Heels        Does the Patient have Skin Breakdown?   No         Octavio Prevention initiated:  NA   Wound Care Orders initiated:  NA      WOC nurse consulted for Pressure Injury (Stage 3,4, Unstageable, DTI, NWPT, and Complex wounds) or Octavio score 18 or lower:  NA      Nurse 1 eSignature: Electronically signed by Marlon Gilbert RN on 1/24/23 at 6:42 PM EST    **SHARE this note so that the co-signing nurse is able to place an eSignature**    Nurse 2 eSignature: Electronically signed by Molly Moss RN on 1/24/23 at 7:04 PM EST

## 2023-01-24 NOTE — OP NOTE
Patient: Chad Scott  YOB: 1970  MRN: [id number]  Date of Procedure: 1/24/2023      PRE PROCEDURE DIAGNOSIS:      Chronic low back pain    Lumbar radiculopathy    Lumbar stenosis L3-4    Lumbar spondylolisthesis L4-5    Class III obesity morbid obesity BMI 43.18    POST PROCEDURE DIAGNOSIS: Same    Procedure Performed:     Anterior retroperitoneal exposure of the L3-4 and L4-5 disk space 62 modifier. Add 22 modifier to the case given increased complexity and time required secondary to patient's morbid obesity  Assistant surgeon for placement of interbody fusion hardware and allograft L3-4, L4-5 80 modifier  Mobilization of Great Vessels    VASCULAR SURGEON PROVIDING EXPOSURE: Deb Davenport MD    SPINE SURGEON: Caren Banuelos MD    ANESTHESIA: GENERAL    ESTIMATED BLOOD LOSS: 20 ml    INDICATION:  The patient is a 46 y.o. male who was seen and evaluated by Dr Renny Carolina for significant back pain and degenerative disk disease. Patient failed conservative management and fel to be appropriate candidate for fusion. I was asked to see the patient to provide the exposure of the above stated disk space. I saw the patient pre op in my office. I discussed with the patient my portion of the surgery as well as the risks, benefits and alternatives. Patient understood the risks included but not limited to significant arterial or venous bleeding that could require transfusion or even be fatal, arterial or venous thrombosis requiring further vascular surgery, thrombectomy, bypass or stenting, injury to bowel, ureter, bladder, lymphatics that could lead to fluid collections or leg swelling, temporary or permanent nerve injury or damage, retrograde ejaculation in men, pain or numbness in the left testicle or thigh as well as the testicles hanging lower and unable to contract up, seroma, lymph leak, hernia, wound infection, hematoma, need for further surgery. Patient understood these risks.   All questions were answered and consent was obtained. DETAILS OF THE PROCEDURE:  The patient was brought to the operating room and placed supine. General anesthesia induced. Viramontes catheter was placed. Continuous pulse ox was placed on left hallux and working. C- arm was brought in from lateral and AP view to flakito the disk space. The abdomen was prepped and draped in sterile fashion. Timeout was then performed. A vertical left paramedian incision was made over the above stated disk space after fluoroscopy was used to identify the location. This was carried down through subcutaneous tissue and farideh's fascia down to the anterior rectus sheath. The sheath was then opened longitudinally to the left of midline. Rectus muscle was mobilized laterally off of midline and elevated. Small tributary vessels heading towards the inferior epigastric vessels were transected after cauterized with bipolar. I was then able to enter the retroperitoneal space in the left lower quadrant by bluntly opening the transversalis fascia with finger and kitner using counter traction. Peritoneal contents and ureter were mobilized from the left to the right. Lela ALIF Retractor was then placed to help with exposure. A total of three blades were used for retraction. At no point during the utilization of the retractor was there undue tension or pressure encountered. The genitofemoral nerve was identified and preserved allowing it to remain directly on the psoas muscle. The left ureter was identified and retracted from the left to the right with the peritoneal packet with no manipulation of the structure and kept out of harm's way with the retractor blades. The patient had an extreme amount of subcutaneous and retroperitoneal adipose tissue from morbid obesity. This made identifying normal abdominal, retroperitoneal and vascular anatomy much more difficult, tedious and time consuming.   This added an additional 45 minutes to the surgical approach to the lumbar spine that without the morbid obesity would not have occurred. This was discussed with the spine surgeon and it was deemed that there were no other adequate alternative surgical approaches to the lumbar spine that would have equivalent outcomes. For this reason I am applying a 22 modifier due to the extreme difficulty encountered during this case with no other surgical alternative. For the exposure of the L4-5 disc space the approach was along the anterior lateral aspect of the left common iliac artery. Of note, the L4-5 disc space was completely covered by the vascular structures coming off the bifurcation of the abdominal aorta and vena cava. The posterior rectus sheath was divided superiorly above the arcuate line for short segment off towards the left of midline. Using bipolar electrocautery, the soft tissue was released along this lateral border with care to preserve the lymphatic drainage of the left lower extremity. The psoas artery branch coming off the lateral aspect of the iliac artery was clipped and transected. Once released, the left common iliac artery was rotated towards the right exposing the superior aspect of th left common iliac vein,. Care was taken to identify the iliolumbar and ascending lumbar veins. These structures were present but did not require ligation. Also, care was taken to identify the main median sacral artery of branches coming off this vessel which were mobilized to the right. Of note care was taken to preserve the left sympathetic nerve fibers and chain. These maneuvers allowed the left common iliac artery and vein to be rotated off the anterior aspect of the spine from left to right. The maintenance of disc space exposure was done utilizing NuVasive ALIF retractor blades. A 180 mm retractor blade to the left to retract and protect the sympathetic chain.   A 160 mm blade was used to the right to help retract and protect the left common iliac vein, left common iliac artery, left ureter and viscera. A 160 mm blade was used cephalad to help retract and protect the viscera and left ureter. The pulse oximetry monitoring of the left lower extremity remained 100% with normal wave pattern and did not require the elevation of systolic blood pressure by the anesthesiologist.  Fluoroscopic images were acquired to confirm the appropriate disc level. For the exposure of the L 3-4 disc space the approach was directed along the anterior lateral aspect of the distal abdominal aorta and proximal left common iliac artery. The lumbar artery coming directly off the abdominal aorta was tethering the aorta to the left and prohibiting it from being mobilized from the left to the right. It was medically necessary to ligate this abdominal artery. It was ligated with 2-0 silk ties, clipped and transected. The lumbar vein coming directly off the inferior vena cava was also prohibiting the vena cava from being fully mobilized from the let to the right and it was medically necessary to ligate this abdominal vein. The lumbar vein was then ligated with 2-0 silk ties, clipped and transected. This allowed the vascular structures to be released and rotated from the left to the right exposing the anterior aspect of the disc space. Care was taken at this level also to preserve the sympathetic nerve fibers and chain. The maintenance of disc space exposure was done utilizing NuVasive ALIF retractor blades. The pulse oximetry monitoring of the left lower extremity remained 100% with normal wave pattern and did not require the elevation of systolic blood pressure by the anesthesiologist.  Fluoroscopic images were acquired to confirm the appropriate disc level. Vascular mobilization and exposure of great vessels: For the exposure of the L4-5 disc space level, complex and extensive dissection was required on the anterior lateral aspect of the left iliac artery. Care was taken to identify the posoas arterial branch coming off the lateral aspect of the iliac artery. If present this vessel was ligated and transected. Once the iliac artery was mobilized it was rotated towards the right exposing the superior aspect of the left iliac vein. As this point a careful inspection for the presence of and iliolumbar vein, ascending lumbar and first order segmental lumbar vessels was done. When present and preventing full mobilization of the left common iliac vein, these abdominal vessels were suture ligated with 2-0 silk ties, clipped and transected. Once  the left iliac vein was mobilized and released it was also rotated onver with the left iliac artery from the left to the right. Care was taken to identify the median sacral artery at this level which is often present once the vessel rotation is complete. If present this vessel was cauterized with bipolar, clipped and transected. For the mobilization and exposure blunt dissection ws done with a kitner dissector and sharp dissection with Metzenbaum scissor. At this level care was taken to preserve the left sympathetic nerve fibers and chain. Also at this level care was taken to preserve the lymphatic drainage of the left lower extremity. The entire L4-5 disc space was adequately exposed with these maneuvers. For the exposure of the L3-4 disc space, the distal aorta was mobilized on the lateral aspect of this vessel with careful inspection and identification of the lumbar segmental artery and vein coming off the abdominal aorta and vena cava respectively. These structures were ligated with silk ties, clips and transected when present. Blunt and sharp dissection was used for this level also. The left sympathetic nerve fibers and chain wer preserved. At this point the distal aorta as well as the proximal iliac vascular structures were rotated off the spine from the left to the right exposing the entire disc space.       Pins were then placed in the above stated disk spaces. C-arm was brought in and confirmed these were the correct spaces. At this time Dr Coreen Mcdermott entered and will have a separate dictation for his portion of the surgery. During this time I was scrubbed and present for the entire surgery. I assisted the spine surgeon with the placement of interbody fusion hardware and allograft at each level. I applied gently retraction on the iliac vessels for exposure of the disk space and safe placement of the hardware. After the hardware was placed the wound was examined and noted to be hemostatic. C-arm confirmed no retained foreign bodies. The anterior rectus sheath was closed with running looped 0 PDS. The subcutaneous tissue and skin closed with 2-0 vicryl and 4-0 monocryl and dry sterile occlusive dressing applied. Urine in the shahid was clear with no blood. Mobilization of great vessels (Unlisted code 30200): The coding rationale for utilization of this unlisted code is there is no specific CPT code for this procedure of mobilization and exposure of the great vessels. The procedure performed and described above can be reasonably compared to add on CPT code [phone], retroperitoneal exposure if iliac arteries for access during endovascular procedure of the great vessels. I am therefor submitting the following unlisted procedure code CPT 06099. Mobilization of the great vessels was medically necessary to complete the spinal instrumentation at each individual level. I estimate that I spent at least 40% of the total operative time performing extensive vascular mobilization of the distal aorta, iliac arteries, iliac veins and vena caval bifurcation. This procedure was necessary for the completion of the spinal fusion and was required to optimize patient outcome and successful surgery.   By performing the complete mobilization of the great vessels, midline orientation of the spinal devices implanted was obtained. At the end of this portion of the procedure all needles, instrument and sponge counts were correct. Patient tolerated this portion of the surgery well and was in stable condition.

## 2023-01-24 NOTE — FLOWSHEET NOTE
Dr. Sherry Jones at bedside who ordered 2 mg of versed and was given. Dr. Sherry Jones ordered haldol. See MAR.

## 2023-01-24 NOTE — BRIEF OP NOTE
Brief Postoperative Note      Patient: Beckie Delgado  YOB: 1970  MRN: [id number]    Date of Procedure: 1/24/2023    Pre-Op Diagnosis: Spondylolisthesis, lumbar region [M43.16]    Post-Op Diagnosis: Same       Procedure(s):  L3-5 ANTERIOR LUMBAR INTERBODY FUSION WITH PEDICLE SCREW FIXATION  . Surgeon(s):  MD Kuldeep Morales MD    Assistant:  Caleb Nielson PA-C    Anesthesia: General    Estimated Blood Loss (mL): less than 171     Complications: None    Specimens:   * No specimens in log *    Implants:  Implant Name Type Inv.  Item Serial No.  Lot No. LRB No. Used Action   [employer] [facility] [id number] - [id number]   [id number]  [id number] N/A 1 Implanted         Drains:   Urinary Catheter 01/24/23 Viramontes (Active)       Findings: As expected    Electronically signed by SAL Del Toro on 1/24/2023 at 1:53 PM

## 2023-01-24 NOTE — PROGRESS NOTES
Patient continually asking to lie on his side in bed. Perfect serve message sent to attending. MD said ok to reposition, patient may lie however is comfortable.

## 2023-01-24 NOTE — ANESTHESIA PRE PROCEDURE
Department of Anesthesiology  Preprocedure Note       Name:  Sruthi Wilks   Age:  46 y.o.  :  1970                                          MRN:  [id number]         Date:  2023      Surgeon: Peterson Deng):  MD Myar Jackson MD    Procedure: Procedure(s):  L3-5 ANTERIOR LUMBAR INTERBODY FUSION WITH PEDICLE SCREW FIXATION  . Medications prior to admission:   Prior to Admission medications    Medication Sig Start Date End Date Taking? Authorizing Provider   metoprolol tartrate (LOPRESSOR) 50 MG tablet TAKE 1 TAB BY MOUTH THE NIGHT BEFORE THE PROCEDURE AND 1 HOUR PRIOR TO PROCEDURE TIME. . 23   Historical Provider, MD   chlorhexidine (PERIDEX) 0.12 % solution  22   Historical Provider, MD   acyclovir (ZOVIRAX) 400 MG tablet TAKE 1 TABLET BY MOUTH TWICE DAILY 22   KAMALA Devi CNP   metFORMIN (GLUCOPHAGE) 500 MG tablet TAKE 1 TABLET BY MOUTH EVERY DAY 22   KAMALA Devi CNP   atorvastatin (LIPITOR) 40 MG tablet TAKE 1 TABLET BY MOUTH EVERY DAY 22   Historical Provider, MD   losartan (COZAAR) 100 MG tablet TAKE 1 TABLET BY MOUTH EVERY DAY 10/10/22   Historical Provider, MD   warfarin (COUMADIN) 5 MG tablet TAKE 2 TABS TUES, THURS, SAT AND SUN AND 3 TABS MON, WED, FRI OR AS DIRECTED 10/8/22   Historical Provider, MD       Current medications:    Current Facility-Administered Medications   Medication Dose Route Frequency Provider Last Rate Last Admin    ceFAZolin (ANCEF) 2,000 mg in sodium chloride 0.9 % 50 mL IVPB (mini-bag)  2,000 mg IntraVENous Once Lisbet Hanks MD        lactated ringers IV soln infusion   IntraVENous Continuous Georgiana Prasad MD 50 mL/hr at 23 1000 New Bag at 23 1000       Allergies:  No Known Allergies    Problem List:    Patient Active Problem List   Diagnosis Code    Type 2 diabetes mellitus without complication, without long-term current use of insulin [facility] E11.9    Essential hypertension I10    LOBO on CPAP G47.33, Z99.89    Mixed hyperlipidemia E78.2    HSV infection B00.9    Paroxysmal A-fib (HCC) I48.0    Lumbar back pain with radiculopathy affecting right lower extremity M54.16    Marijuana use F12.90    Lumbar foraminal stenosis M48.061    Low HDL (under 40) E78.6       Past Medical History:        Diagnosis Date    [facility]     Anesthesia     PATIENT STATES HAS NEVER HAD BEFORE AND NO FAMILY HISTORY OF PROBLEMS WITH ANESTHESIA    Diabetes mellitus ([facility]     Herpes     Hyperlipidemia     Hypertension     LOBO on CPAP     Wears glasses     Wears partial dentures     LOWER       Past Surgical History:        Procedure Laterality Date    INSERTABLE CARDIAC MONITOR      LOOP RECORDER       Social History:    Social History     Tobacco Use    Smoking status: Former     Packs/day: 0.50     Years: 20.00     Pack years: 10.00     Types: Cigarettes     Start date: 1990     Quit date:      Years since quittin.0     Passive exposure: Past    Smokeless tobacco: Never   Substance Use Topics    Alcohol use: Not Currently                                Counseling given: Not Answered      Vital Signs (Current):   Vitals:    23 1458 23 0841   BP:  (!) 159/97   Pulse:  66   Resp:  16   Temp:  97.2 °F (36.2 °C)   TempSrc:  Temporal   SpO2:  100%   Weight: 294 lb (133.4 kg) 292 lb 6.4 oz (132.6 kg)   Height: 5' 9\" (1.753 m) 5' 9\" (1.753 m)                                              BP Readings from Last 3 Encounters:   23 (!) 159/97   01/10/23 132/80   10/25/22 124/88       NPO Status: Time of last liquid consumption:                         Time of last solid consumption:                         Date of last liquid consumption: 23                        Date of last solid food consumption: 23    BMI:   Wt Readings from Last 3 Encounters:   23 292 lb 6.4 oz (132.6 kg)   01/10/23 294 lb (133.4 kg)   10/25/22 293 lb (132.9 kg)     Body mass index is 43.18 kg/m².     CBC:   Lab Results   Component Value Date/Time    WBC 8.1 01/10/2023 10:18 AM    RBC 5.68 01/10/2023 10:18 AM    HGB 13.2 01/10/2023 10:18 AM    HCT 41.6 01/10/2023 10:18 AM    MCV 73.4 01/10/2023 10:18 AM    RDW 16.6 01/10/2023 10:18 AM     01/10/2023 10:18 AM       CMP:   Lab Results   Component Value Date/Time     01/10/2023 10:18 AM    K 4.1 01/10/2023 10:18 AM     01/10/2023 10:18 AM    CO2 24 01/10/2023 10:18 AM    BUN 17 01/10/2023 10:18 AM    CREATININE 1.0 01/10/2023 10:18 AM    AGRATIO 1.6 01/10/2023 10:18 AM    LABGLOM >60 01/10/2023 10:18 AM    GLUCOSE 116 01/10/2023 10:18 AM    PROT 6.9 01/10/2023 10:18 AM    CALCIUM 9.8 01/10/2023 10:18 AM    BILITOT 0.3 01/10/2023 10:18 AM    ALKPHOS 83 01/10/2023 10:18 AM    AST 20 01/10/2023 10:18 AM    ALT 37 01/10/2023 10:18 AM       POC Tests:   Recent Labs     01/24/23  0926   POCGLU 128*       Coags:   Lab Results   Component Value Date/Time    PROTIME 14.6 01/24/2023 09:20 AM    INR 1.15 01/24/2023 09:20 AM    APTT 30.5 01/10/2023 10:18 AM       HCG (If Applicable): No results found for: PREGTESTUR, PREGSERUM, HCG, HCGQUANT     ABGs: No results found for: PHART, PO2ART, WRV1FTL, UQA5WMX, BEART, Y8GXVXSK     Type & Screen (If Applicable):  No results found for: LABABO, LABRH    Drug/Infectious Status (If Applicable):  No results found for: HIV, HEPCAB    COVID-19 Screening (If Applicable): No results found for: COVID19        Anesthesia Evaluation  Patient summary reviewed and Nursing notes reviewed  Airway: Mallampati: II  TM distance: >3 FB   Neck ROM: full  Mouth opening: > = 3 FB   Dental:    (+) partials      Pulmonary: breath sounds clear to auscultation  (+) sleep apnea:                             Cardiovascular:  Exercise tolerance: good (>4 METS),   (+) hypertension:, dysrhythmias: atrial fibrillation,         Rhythm: regular  Rate: normal                    Neuro/Psych:   (+) neuromuscular disease:,             GI/Hepatic/Renal:   (+) morbid obesity          Endo/Other:    (+) DiabetesType II DM, poorly controlled, , .                 Abdominal:   (+) obese,           Vascular: Other Findings:           Anesthesia Plan      general     ASA 3       Induction: intravenous. MIPS: Postoperative opioids intended. Anesthetic plan and risks discussed with patient. Use of blood products discussed with patient whom. Plan discussed with CRNA.                     Sayra Stack MD   1/24/2023

## 2023-01-24 NOTE — FLOWSHEET NOTE
Dr. Cosmo Cortez at bedside. Pt mother and sister called back to bedside. ABG clotted. Charge RN Jose Enrique Spencer from OR will be back to bedside to redraw blood.

## 2023-01-24 NOTE — PROGRESS NOTES
PACU Transfer Note    Vitals:    01/24/23 1759   BP:    Pulse: 76   Resp: 15   Temp:    SpO2: 100%       In: 2400.6 [P.O.:20; I.V.:2270.6]  Out: 950 [Urine:925]    Pain assessment:  none  Pain Level: 6    Report given to Receiving unit RN with personal belongings including cell phone. Sister made aware of room number.      1/24/2023 6:00 PM

## 2023-01-24 NOTE — ANESTHESIA POSTPROCEDURE EVALUATION
Department of Anesthesiology  Postprocedure Note    Patient: Chad Scott  MRN: [id number]  YOB: 1970  Date of evaluation: 1/24/2023      Procedure Summary     Date: 01/24/23 Room / Location: [facility] OR  / [facility]    Anesthesia Start: 1026 Anesthesia Stop: 3448    Procedures:       L3-5 ANTERIOR LUMBAR INTERBODY FUSION WITH PEDICLE SCREW FIXATION (Spine Lumbar)      . (Spine Lumbar) Diagnosis:       Spondylolisthesis, lumbar region      (Spondylolisthesis, lumbar region [M43.16])    Surgeons: Kate Thomas MD Responsible Provider: Lisset Sewell MD    Anesthesia Type: general ASA Status: 3          Anesthesia Type: No value filed. Ilir Phase I: Ilir Score: 6    Ilir Phase II:        Anesthesia Post Evaluation    Patient location during evaluation: PACU  Level of consciousness: awake, confused and combative  Complications: no  Comments: Has been combative and delirious. HD stable. Given Haldol, ketamine and Precedex. On Precedex gtt.  sats fine. ABG sent.   Multimodal analgesia pain management approach

## 2023-01-24 NOTE — CONSULTS
[facility] Day: 1  ICU Day:  1                                                        Code:Full Code  Admit Date: 1/24/2023  PCP: KAMALA Juárez CNP                                  CC: agitation     HISTORY OF PRESENT ILLNESS:     Antonette Holter is a 46 y.o male with a medical history of  paroxysmal atrial fibrillation on warfarin, hyperlipidemia, DM2, LOBO on CPAP at home, morbid obesity with BMI 43, chronic low back pain, lumbar radiculopathy, lumbar foraminal stenosis L3-4, lumbar spondylolisthesis, who presented to the hospital today for elective L3-5 anterior lumbar interbody fusion with pedicle screw fixation done in the OR today. Surgery was uneventful. He was subsequently admitted to PACU. While in the PACU, he was noted to be increasingly agitated and combative. He received 2 mg of versed, 5 mg haldol x 2 without improvement in symptoms. ABG was done which showed 7.35/45.3/96. 4. He was started on precedex gtt and transferred to the ICU. Warfarin been on hold for the past 5 days prior to surgery. On arrival to the ICU, patient was calm, non-combative and responding appropriately to questions. He is HDS. He is on 0.2 mcg/kg/hr of precedex gtt and on home CPAP. He was weaned off 2L of oxygen and currently on room air. He reports pain at op site (abdomen and back). He denies  chest pain, nausea, fevers, SOB, cough, constipation, or diarrhea.      PAST HISTORY:     Past Medical History:   Diagnosis Date    A-fib ([facility]     Anesthesia     PATIENT STATES HAS NEVER HAD BEFORE AND NO FAMILY HISTORY OF PROBLEMS WITH ANESTHESIA    Diabetes mellitus ([facility]     Herpes     Hyperlipidemia     Hypertension     LOBO on CPAP     Wears glasses     Wears partial dentures     LOWER       Past Surgical History:   Procedure Laterality Date    INSERTABLE CARDIAC MONITOR      LOOP RECORDER       SocialHistory:   The patient lives at home alone     Alcohol:  Illicit drugs: marijuana   Tobacco:  former smoker     Family History:  History reviewed. No pertinent family history. MEDICATIONS:     No current facility-administered medications on file prior to encounter. Current Outpatient Medications on File Prior to Encounter   Medication Sig Dispense Refill    metoprolol tartrate (LOPRESSOR) 50 MG tablet TAKE 1 TAB BY MOUTH THE NIGHT BEFORE THE PROCEDURE AND 1 HOUR PRIOR TO PROCEDURE TIME. Rafael Cesar atorvastatin (LIPITOR) 40 MG tablet TAKE 1 TABLET BY MOUTH EVERY DAY      losartan (COZAAR) 100 MG tablet TAKE 1 TABLET BY MOUTH EVERY DAY      warfarin (COUMADIN) 5 MG tablet TAKE 2 TABS TUES, THURS, SAT AND SUN AND 3 TABS MON, WED, FRI OR AS DIRECTED           Scheduled Meds:   acyclovir  400 mg Oral BID    atorvastatin  40 mg Oral Daily    losartan  100 mg Oral Daily    metFORMIN  500 mg Oral Daily    sodium chloride flush  5-40 mL IntraVENous 2 times per day    acetaminophen  650 mg Oral Q6H    ceFAZolin (ANCEF) IVPB  3,000 mg IntraVENous Q8H    polyethylene glycol  17 g Oral Daily    ketamine  20 mg IntraVENous Once      Continuous Infusions:   sodium chloride      0.9% NaCl with KCl 20 mEq 100 mL/hr at 01/24/23 1814    dexmedetomidine (PRECEDEX) IV infusion 0.2 mcg/kg/hr (01/24/23 1726)     PRN Meds:sodium chloride flush, sodium chloride, ondansetron **OR** ondansetron, oxyCODONE **OR** oxyCODONE, morphine **OR** morphine, methocarbamol **OR** methocarbamol IVPB, bisacodyl, bisacodyl    Allergies: No Known Allergies    REVIEW OF SYSTEMS:       History obtained from the patient, chart review     Review of Systems   Constitutional:  Negative for activity change, appetite change, chills, fatigue and fever. Respiratory:  Negative for cough, chest tightness and shortness of breath. Cardiovascular:  Negative for chest pain and leg swelling. Gastrointestinal:  Positive for abdominal pain. Negative for abdominal distention, constipation, diarrhea, nausea and vomiting.         Pain at abdominal surgical incision site Genitourinary:  Negative for dysuria and flank pain. Musculoskeletal:  Positive for back pain. Pain at surgical incision site    Psychiatric/Behavioral:  Negative for confusion. PHYSICAL EXAM:       Vitals: BP (!) 154/84   Pulse 82   Temp 98.6 °F (37 °C) (Oral)   Resp 22   Ht 5' 9\" (1.753 m)   Wt 292 lb 8.8 oz (132.7 kg)   SpO2 95%   BMI 43.20 kg/m²     I/O:    Intake/Output Summary (Last 24 hours) at 1/24/2023 1827  Last data filed at 1/24/2023 1759  Gross per 24 hour   Intake 2400.62 ml   Output 950 ml   Net 1450.62 ml     I/O this shift:  In: 2400.6 [P.O.:20; I.V.:2270.6; IV Piggyback:110]  Out: 950 [Urine:925; Blood:25]  No intake/output data recorded. Physical Examination:     Physical Exam  Constitutional:       General: He is not in acute distress. Appearance: He is obese. HENT:      Head: Normocephalic. Eyes:      Pupils: Pupils are equal, round, and reactive to light. Cardiovascular:      Rate and Rhythm: Normal rate. Pulses: Normal pulses. Pulmonary:      Effort: Pulmonary effort is normal.      Breath sounds: Normal breath sounds. No wheezing. Comments: examination limited by obesity, breath sounds distant   Abdominal:      General: Bowel sounds are normal.      Palpations: Abdomen is soft. Musculoskeletal:         General: No tenderness. Right lower leg: No edema. Left lower leg: No edema. Skin:     General: Skin is warm and dry. Capillary Refill: Capillary refill takes less than 2 seconds. Coloration: Skin is not jaundiced. Neurological:      Mental Status: He is alert and oriented to person, place, and time. Psychiatric:         Mood and Affect: Mood normal.         Behavior: Behavior normal.         Thought Content:  Thought content normal.         Judgment: Judgment normal.       Recent Labs     01/24/23  1721   PHART 7.359   CBU1HGX 45.3*   PO2ART 96.4           DATA:       Labs:  CBC: No results for input(s): WBC, HGB, HCT, PLT in the last 72 hours. BMP: No results for input(s): NA, K, CL, CO2, BUN, CREATININE, GLUCOSE, PHOS in the last 72 hours. Invalid input(s):  CA  LFT's: No results for input(s): AST, ALT, ALB, BILITOT, ALKPHOS in the last 72 hours. Troponin: No results for input(s): TROPONINI in the last 72 hours. BNP:No results for input(s): BNP in the last 72 hours. ABGs:   Recent Labs     01/24/23  1721   PHART 7.359   JIG6SER 45.3*   PO2ART 96.4     INR:   Recent Labs     01/24/23  0920   INR 1.15*       U/A:No results for input(s): NITRITE, COLORU, PHUR, LABCAST, WBCUA, RBCUA, MUCUS, TRICHOMONAS, YEAST, BACTERIA, CLARITYU, SPECGRAV, LEUKOCYTESUR, UROBILINOGEN, BILIRUBINUR, BLOODU, GLUCOSEU, AMORPHOUS in the last 72 hours. Invalid input(s): KETONESU    XR LUMBAR SPINE (2-3 VIEWS)   Final Result   1. See above. FLUORO FOR SURGICAL PROCEDURES   Final Result   1. See above. EKG:   Echo:  Micro:     ASSESSMENT AND PLAN:   Chad Scott is a 46 y.o. male, with a medical history of  paroxysmal atrial fibrillation on warfarin, chronic low back pain, lumbar radiculopathy, lumbar foraminal stenosis L3-4, lumbar spondylolisthesis, who presented to the hospital today for elective L3-5 anterior lumbar interbody fusion with pedicle screw fixation today and became increasingly agitated while in recovery in the PACU and was admitted to the ICU for precedex gtt and further management.      Lumbar spondylolisthesis  Lumbar radiculopathy  Lumbar foraminal stenosis L3-4  Agitation   -POD #0 L3-5 anterior lumbar interbody fusion with pedicle screw fixation  -Pain mgt per primary   -On Cefazolin   -On IVF  with KCl 20 mEq infusion   -glycolax daily   -On precedex gtt for agitation, wean as tolerated     DM2   -HbA1c 6.3 10/25/22  -On metformin 500 mg daily at home, hold while in patient   -SSI/hypoglycemic protocol/POC glucose checks     Paroxysmal Afib on warfarin  -CHADSVASc 2   -Warfarin currently held since 5 days for surgery   -resume per surgical recommendations   -Follows with [facility]ology at [facility], has next appointment at 01/30/23    Mixed Hyperlipidemia   Continue home lipitor 40 mg daily     Hypertension   Continue home losartan 100 mg daily   -Monitor BP     LOBO   -continue CPAP nightly and PRN  - on weight loss      HSV infection   -Continue home acyclovir 400 mg BID     Code Status:Full Code  FEN: Adult Regular; 3 carb choices   PPX:  SCDs  DISPO: ICU     This patient has been staffed and discussed with Nasra Welch MD     -----------------------------  Mina Medina MD, PGY-1  1/24/2023  6:27 PM    Pulmonary & Critical Care    Patient seen and examined. I agree with Dr. Patricia Chen history, physical, lab findings, assessment and plan. Pt with agitation post-op that was difficult to manage. Precedex IVP helped much more over haldol, ketamine and versed. He was transferred to ICU for precedex gtt to control agitation. He has LOBO and is obese with BMI 43. Post op ABG was 7.36/45/96. When I saw him in ICU he was on precedex 0.2 and was very calm and cooperative. He was fully alert and oriented. He complained of back pain but no dyspnea or CP.      Cont precedex to prevent agitation  Home CPAP ordered  DM - Follow FSBS  Other Post op mgmt per Moses Weinberg MD

## 2023-01-24 NOTE — FLOWSHEET NOTE
Pt mother and sister called writer and upset that Dr. Aileen Vick has not talked with family. Sister upset with writer. Informed sister Aashish Jackson has been at bedside the past two hours trying to keep pt safe. Call given to charge NAMITA mann.

## 2023-01-24 NOTE — PROGRESS NOTES
Patient admitted to PACU # 11 from OR at 1407 post L3-5 ANTERIOR LUMBAR INTERBODY FUSION WITH PEDICLE SCREW FIXATION   per Dr. Yesi Fabian and Dr. Michael Anderson. Attached to PACU monitoring system and report received from anesthesia provider. Patient arrived to pacu with oral airway and nasopharyngeal to R nare. Pt on nonrebreather mask at 15 L. Pt NSR on monitor. Abd surgical incision c/d/I with surgical glue. . Unable to view back incisions at this time. Will assess when airways is removed. Will continue to monitor.

## 2023-01-25 ENCOUNTER — APPOINTMENT (OUTPATIENT)
Dept: VASCULAR LAB | Age: 53
DRG: 304 | End: 2023-01-25
Attending: NEUROLOGICAL SURGERY
Payer: COMMERCIAL

## 2023-01-25 VITALS
OXYGEN SATURATION: 96 % | DIASTOLIC BLOOD PRESSURE: 91 MMHG | HEART RATE: 86 BPM | SYSTOLIC BLOOD PRESSURE: 173 MMHG | WEIGHT: 292.55 LBS | RESPIRATION RATE: 27 BRPM | TEMPERATURE: 98 F | HEIGHT: 69 IN | BODY MASS INDEX: 43.33 KG/M2

## 2023-01-25 PROBLEM — R45.1 AGITATION: Status: ACTIVE | Noted: 2023-01-25

## 2023-01-25 PROBLEM — Z98.1 S/P LUMBAR FUSION: Status: ACTIVE | Noted: 2023-01-25

## 2023-01-25 LAB
ANION GAP SERPL CALCULATED.3IONS-SCNC: 8 MMOL/L (ref 3–16)
BUN BLDV-MCNC: 16 MG/DL (ref 7–20)
CALCIUM SERPL-MCNC: 9.7 MG/DL (ref 8.3–10.6)
CHLORIDE BLD-SCNC: 104 MMOL/L (ref 99–110)
CO2: 26 MMOL/L (ref 21–32)
CREAT SERPL-MCNC: 1.1 MG/DL (ref 0.9–1.3)
GFR SERPL CREATININE-BSD FRML MDRD: >60 ML/MIN/{1.73_M2}
GLUCOSE BLD-MCNC: 141 MG/DL (ref 70–99)
GLUCOSE BLD-MCNC: 158 MG/DL (ref 70–99)
HCT VFR BLD CALC: 37.7 % (ref 40.5–52.5)
HEMOGLOBIN: 11.8 G/DL (ref 13.5–17.5)
MCH RBC QN AUTO: 23.4 PG (ref 26–34)
MCHC RBC AUTO-ENTMCNC: 31.4 G/DL (ref 31–36)
MCV RBC AUTO: 74.5 FL (ref 80–100)
PDW BLD-RTO: 16.2 % (ref 12.4–15.4)
PERFORMED ON: ABNORMAL
PLATELET # BLD: 163 K/UL (ref 135–450)
PMV BLD AUTO: 9 FL (ref 5–10.5)
POTASSIUM SERPL-SCNC: 4.3 MMOL/L (ref 3.5–5.1)
RBC # BLD: 5.06 M/UL (ref 4.2–5.9)
SODIUM BLD-SCNC: 138 MMOL/L (ref 136–145)
WBC # BLD: 12.6 K/UL (ref 4–11)

## 2023-01-25 PROCEDURE — 2580000003 HC RX 258: Performed by: PHYSICIAN ASSISTANT

## 2023-01-25 PROCEDURE — 80048 BASIC METABOLIC PNL TOTAL CA: CPT

## 2023-01-25 PROCEDURE — 2700000000 HC OXYGEN THERAPY PER DAY

## 2023-01-25 PROCEDURE — 99024 POSTOP FOLLOW-UP VISIT: CPT | Performed by: NURSE PRACTITIONER

## 2023-01-25 PROCEDURE — 6360000002 HC RX W HCPCS: Performed by: PHYSICIAN ASSISTANT

## 2023-01-25 PROCEDURE — 97535 SELF CARE MNGMENT TRAINING: CPT

## 2023-01-25 PROCEDURE — 85027 COMPLETE CBC AUTOMATED: CPT

## 2023-01-25 PROCEDURE — 36415 COLL VENOUS BLD VENIPUNCTURE: CPT

## 2023-01-25 PROCEDURE — APPNB45 APP NON BILLABLE 31-45 MINUTES: Performed by: NURSE PRACTITIONER

## 2023-01-25 PROCEDURE — 97530 THERAPEUTIC ACTIVITIES: CPT

## 2023-01-25 PROCEDURE — 6370000000 HC RX 637 (ALT 250 FOR IP): Performed by: PHYSICIAN ASSISTANT

## 2023-01-25 PROCEDURE — 94660 CPAP INITIATION&MGMT: CPT

## 2023-01-25 PROCEDURE — 97116 GAIT TRAINING THERAPY: CPT

## 2023-01-25 PROCEDURE — 99232 SBSQ HOSP IP/OBS MODERATE 35: CPT | Performed by: INTERNAL MEDICINE

## 2023-01-25 PROCEDURE — 97166 OT EVAL MOD COMPLEX 45 MIN: CPT

## 2023-01-25 PROCEDURE — 97162 PT EVAL MOD COMPLEX 30 MIN: CPT

## 2023-01-25 PROCEDURE — 94761 N-INVAS EAR/PLS OXIMETRY MLT: CPT

## 2023-01-25 PROCEDURE — 6370000000 HC RX 637 (ALT 250 FOR IP): Performed by: STUDENT IN AN ORGANIZED HEALTH CARE EDUCATION/TRAINING PROGRAM

## 2023-01-25 PROCEDURE — 93971 EXTREMITY STUDY: CPT

## 2023-01-25 RX ORDER — OXYCODONE HYDROCHLORIDE 5 MG/1
5-10 TABLET ORAL EVERY 6 HOURS PRN
Qty: 42 TABLET | Refills: 0 | Status: SHIPPED | OUTPATIENT
Start: 2023-01-25 | End: 2023-02-01

## 2023-01-25 RX ORDER — POLYETHYLENE GLYCOL 3350 17 G/17G
17 POWDER, FOR SOLUTION ORAL DAILY
Qty: 10 EACH | Refills: 0 | Status: SHIPPED | OUTPATIENT
Start: 2023-01-26 | End: 2023-02-05

## 2023-01-25 RX ORDER — METHOCARBAMOL 750 MG/1
750 TABLET, FILM COATED ORAL EVERY 8 HOURS PRN
Qty: 30 TABLET | Refills: 0 | Status: SHIPPED | OUTPATIENT
Start: 2023-01-25 | End: 2023-02-04

## 2023-01-25 RX ORDER — WARFARIN SODIUM 5 MG/1
TABLET ORAL
Qty: 30 TABLET | Refills: 3 | Status: SHIPPED | OUTPATIENT
Start: 2023-02-07

## 2023-01-25 RX ADMIN — OXYCODONE HYDROCHLORIDE 10 MG: 5 TABLET ORAL at 07:22

## 2023-01-25 RX ADMIN — ACETAMINOPHEN 650 MG: 325 TABLET ORAL at 11:48

## 2023-01-25 RX ADMIN — ACETAMINOPHEN 650 MG: 325 TABLET ORAL at 06:00

## 2023-01-25 RX ADMIN — CEFAZOLIN 3000 MG: 10 INJECTION, POWDER, FOR SOLUTION INTRAVENOUS at 03:36

## 2023-01-25 RX ADMIN — Medication 5 ML: at 09:18

## 2023-01-25 RX ADMIN — METHOCARBAMOL 750 MG: 750 TABLET ORAL at 02:17

## 2023-01-25 RX ADMIN — ACYCLOVIR 400 MG: 400 TABLET ORAL at 09:17

## 2023-01-25 RX ADMIN — ATORVASTATIN CALCIUM 40 MG: 40 TABLET, FILM COATED ORAL at 09:17

## 2023-01-25 RX ADMIN — LOSARTAN POTASSIUM 100 MG: 100 TABLET, FILM COATED ORAL at 09:17

## 2023-01-25 RX ADMIN — OXYCODONE HYDROCHLORIDE 10 MG: 5 TABLET ORAL at 02:16

## 2023-01-25 RX ADMIN — POLYETHYLENE GLYCOL 3350 17 G: 17 POWDER, FOR SOLUTION ORAL at 09:17

## 2023-01-25 RX ADMIN — METHOCARBAMOL 750 MG: 750 TABLET ORAL at 11:00

## 2023-01-25 RX ADMIN — OXYCODONE HYDROCHLORIDE 10 MG: 5 TABLET ORAL at 12:10

## 2023-01-25 ASSESSMENT — PAIN SCALES - GENERAL
PAINLEVEL_OUTOF10: 7
PAINLEVEL_OUTOF10: 5
PAINLEVEL_OUTOF10: 3
PAINLEVEL_OUTOF10: 5
PAINLEVEL_OUTOF10: 8
PAINLEVEL_OUTOF10: 7
PAINLEVEL_OUTOF10: 5
PAINLEVEL_OUTOF10: 10

## 2023-01-25 ASSESSMENT — ENCOUNTER SYMPTOMS
WHEEZING: 0
DIARRHEA: 0
CONSTIPATION: 0
SHORTNESS OF BREATH: 0
BACK PAIN: 1
COUGH: 0

## 2023-01-25 ASSESSMENT — PAIN DESCRIPTION - ORIENTATION
ORIENTATION: LOWER;LEFT
ORIENTATION: MID;LOWER
ORIENTATION: MID;LOWER

## 2023-01-25 ASSESSMENT — PAIN DESCRIPTION - PAIN TYPE
TYPE: SURGICAL PAIN
TYPE: SURGICAL PAIN

## 2023-01-25 ASSESSMENT — PAIN - FUNCTIONAL ASSESSMENT
PAIN_FUNCTIONAL_ASSESSMENT: ACTIVITIES ARE NOT PREVENTED
PAIN_FUNCTIONAL_ASSESSMENT: ACTIVITIES ARE NOT PREVENTED

## 2023-01-25 ASSESSMENT — PAIN DESCRIPTION - DESCRIPTORS
DESCRIPTORS: ACHING

## 2023-01-25 ASSESSMENT — PAIN DESCRIPTION - ONSET: ONSET: ON-GOING

## 2023-01-25 ASSESSMENT — PAIN DESCRIPTION - LOCATION
LOCATION: BACK
LOCATION: LEG
LOCATION: KNEE
LOCATION: BACK

## 2023-01-25 ASSESSMENT — PAIN DESCRIPTION - FREQUENCY: FREQUENCY: INTERMITTENT

## 2023-01-25 NOTE — PROGRESS NOTES
ICU Progress Note    Admit Date: 1/24/2023  Day: 1  Vent Day: None  IV Access:  Peripheral Right arm, L Hand  IV Fluids:None  Vasopressors:None                Antibiotics: None  Diet: ADULT DIET; Regular; 3 carb choices (45 gm/meal)    CC: Agitation     Interval history: Pt. States he is in a little pain but that it is tolerable. He is hungry and already ordered food. Pt. Was speaking with OT and PT and talking about how to stay comfortable and safe movements. Pt. Is stable to be signed off from the ICU team and downgraded out of the ICU when surgery team deems safe. HPI: Manuel Sandoval is a 46 y.o male with a medical history of  paroxysmal atrial fibrillation on warfarin, hyperlipidemia, DM2, LOBO on CPAP at home, morbid obesity with BMI 43, chronic low back pain, lumbar radiculopathy, lumbar foraminal stenosis L3-4, lumbar spondylolisthesis, who presented to the hospital today for elective L3-5 anterior lumbar interbody fusion with pedicle screw fixation done in the OR today. Surgery was uneventful. He was subsequently admitted to PACU. While in the PACU, he was noted to be increasingly agitated and combative. He received 2 mg of versed, 5 mg haldol x 2 without improvement in symptoms. ABG was done which showed 7.35/45.3/96. 4. He was started on precedex gtt and transferred to the ICU. Warfarin been on hold for the past 5 days prior to surgery. On arrival to the ICU, patient was calm, non-combative and responding appropriately to questions. He is HDS. He is on 0.2 mcg/kg/hr of precedex gtt and on home CPAP. He was weaned off 2L of oxygen and currently on room air. He reports pain at op site (abdomen and back). He denies  chest pain, nausea, fevers, SOB, cough, constipation, or diarrhea.      Medications:     Scheduled Meds:   acyclovir  400 mg Oral BID    atorvastatin  40 mg Oral Daily    losartan  100 mg Oral Daily    sodium chloride flush  5-40 mL IntraVENous 2 times per day    acetaminophen  650 mg Oral Q6H    polyethylene glycol  17 g Oral Daily    insulin lispro  0-4 Units SubCUTAneous TID WC    insulin lispro  0-4 Units SubCUTAneous Nightly     Continuous Infusions:   sodium chloride      0.9% NaCl with KCl 20 mEq 100 mL/hr at 01/24/23 1814    dexmedetomidine (PRECEDEX) IV infusion Stopped (01/25/23 0340)    dextrose       PRN Meds:sodium chloride flush, sodium chloride, ondansetron **OR** ondansetron, oxyCODONE **OR** oxyCODONE, morphine **OR** morphine, methocarbamol **OR** methocarbamol IVPB, bisacodyl, bisacodyl, glucose, dextrose bolus **OR** dextrose bolus, glucagon (rDNA), dextrose    Objective:   Vitals:   T-max:  Patient Vitals for the past 8 hrs:   BP Temp Temp src Pulse Resp SpO2   01/25/23 0800 (!) 145/95 -- -- 62 13 --   01/25/23 0735 (!) 171/94 98.3 °F (36.8 °C) Oral 63 19 100 %   01/25/23 0700 -- -- -- 67 15 99 %   01/25/23 0600 -- -- -- 86 23 100 %   01/25/23 0542 -- -- -- 69 15 96 %   01/25/23 0400 126/77 -- -- 70 18 98 %   01/25/23 0300 (!) 148/85 -- -- 83 25 98 %   01/25/23 0200 127/73 97.7 °F (36.5 °C) Oral 77 17 100 %   01/25/23 0140 -- -- -- 60 14 100 %   01/25/23 0102 -- -- -- 65 14 99 %   01/25/23 0100 118/84 -- -- 75 17 98 %       Intake/Output Summary (Last 24 hours) at 1/25/2023 0811  Last data filed at 1/25/2023 0800  Gross per 24 hour   Intake 3920.62 ml   Output 2335 ml   Net 1585.62 ml       Review of Systems   Constitutional:  Positive for appetite change. Negative for fatigue and fever. Pt. Is hungry. Respiratory:  Negative for cough, shortness of breath and wheezing. Cardiovascular:  Negative for chest pain. Gastrointestinal:  Negative for constipation and diarrhea. Musculoskeletal:  Positive for back pain. Neurological:  Negative for dizziness, light-headedness and numbness. Psychiatric/Behavioral:  Negative for behavioral problems and confusion. Physical Exam  Constitutional:       Appearance: Normal appearance. He is obese.  He is not ill-appearing or toxic-appearing. HENT:      Head: Normocephalic and atraumatic. Right Ear: External ear normal.      Left Ear: External ear normal.   Eyes:      Extraocular Movements: Extraocular movements intact. Cardiovascular:      Rate and Rhythm: Normal rate and regular rhythm. Pulses: Normal pulses. Pulmonary:      Effort: Pulmonary effort is normal.   Abdominal:      Palpations: Abdomen is soft. Musculoskeletal:      Right lower leg: No edema. Left lower leg: No edema. Neurological:      General: No focal deficit present. Mental Status: He is alert and oriented to person, place, and time. LABS:    CBC:   Recent Labs     01/25/23  0530   WBC 12.6*   HGB 11.8*   HCT 37.7*      MCV 74.5*     Renal:    Recent Labs     01/25/23  0530      K 4.3      CO2 26   BUN 16   CREATININE 1.1   GLUCOSE 141*   CALCIUM 9.7   ANIONGAP 8     Hepatic: No results for input(s): AST, ALT, BILITOT, BILIDIR, PROT, LABALBU, ALKPHOS in the last 72 hours. Troponin: No results for input(s): TROPONINI in the last 72 hours. BNP: No results for input(s): BNP in the last 72 hours. Lipids: No results for input(s): CHOL, HDL in the last 72 hours. Invalid input(s): LDLCALCU, TRIGLYCERIDE  ABGs:    Recent Labs     01/24/23  1721   PHART 7.359   NDH7NTL 45.3*   PO2ART 96.4   TJR4QJX 25.5   BEART 0   I2WTPLVF 97   NAE8CZB 27       INR:   Recent Labs     01/24/23  0920   INR 1.15*     Lactate: No results for input(s): LACTATE in the last 72 hours. Cultures:  -----------------------------------------------------------------  RAD:   XR LUMBAR SPINE (2-3 VIEWS)   Final Result   1. See above. FLUORO FOR SURGICAL PROCEDURES   Final Result   1. See above.       VL Extremity Venous Left    (Results Pending)         Assessment/Plan:   Navdeep Krause is a 46 y.o. male, with a medical history of  paroxysmal atrial fibrillation on warfarin, chronic low back pain, lumbar radiculopathy, lumbar foraminal stenosis L3-4, lumbar spondylolisthesis, who presented to the hospital today for elective L3-5 anterior lumbar interbody fusion with pedicle screw fixation today and became increasingly agitated while in recovery in the PACU and was admitted to the ICU for precedex gtt and further management. Lumbar spondylolisthesis  Lumbar radiculopathy  Lumbar foraminal stenosis L3-4  Agitation   -POD #1 L3-5 anterior lumbar interbody fusion with pedicle screw fixation  -Pain mgt per primary   -On Cefazolin   -On IVF with KCl 20 mEq infusion   -Glycolax daily   -On precedex gtt for agitation, wean as tolerated      DM2   -HbA1c 6.3 10/25/22  -On metformin 500 mg daily at home, hold while in patient   -SSI/hypoglycemic protocol/POC glucose checks      Paroxysmal Afib on warfarin  -CHADSVASc 2   -Warfarin currently held since 5 days for surgery   -resume per surgical recommendations   -Follows with cardiology at [facility], has next appointment at 01/30/23     Mixed Hyperlipidemia   Continue home lipitor 40 mg daily      Hypertension   Continue home losartan 100 mg daily   -Monitor BP      LOBO   -continue CPAP nightly and PRN  - on weight loss       HSV infection   -Continue home acyclovir 400 mg BID      Code Status:Full Code  FEN: Adult Regular; 3 carb choices   PPX:  SCDs  DISPO: ICU     Kris Broderick MD, PGY-1  01/25/23  8:11 AM    This patient has been staffed and discussed with Fawad Fernandez MD    Pulmonary & Critical Care     Patient seen and examined. I agree with Dr. Lana Enriquez history, physical, lab findings, assessment and plan. Pt with severe agitation post-op yesterday evening that was difficult to manage. Precedex IVP helped much more over haldol, ketamine and versed. He was transferred to ICU yesterday evening for precedex gtt to control agitation. When I saw him last night and ICU he was on precedex 0.2 and was very calm and cooperative. He was fully alert and oriented.  He complained of back pain but no dyspnea or CP. He did very well overnight and this morning is calm off of Precedex. He is apologetic for his behavior yesterday postoperatively. I reassured him that this was an anesthetic effect and not to worry about it.     He is being discharged home by the neurosurgical service     Jaky Hernandez MD

## 2023-01-25 NOTE — PLAN OF CARE
Problem: Pain  Goal: Verbalizes/displays adequate comfort level or baseline comfort level  Outcome: Progressing

## 2023-01-25 NOTE — DISCHARGE SUMMARY
Discharge Summary    Date of Admission: 1/24/2023  8:38 AM  Date of Discharge: 1/25/2023  Admission Diagnosis: Spondylolisthesis, lumbar region [M43.16]  Discharge Diagnosis: Same   Condition on Discharge: good  Attending for Admission: Rony Woodruff MD  Procedures: Procedure(s) (LRB):  L3-5 ANTERIOR LUMBAR INTERBODY FUSION WITH PEDICLE SCREW FIXATION (N/A)  . (N/A)  Consults: IP CONSULT TO HOSPITALIST    Reason for Admission:  Sidney Arrington is a 46 y.o. male patient who was admitted to the hospital for complaints of back and leg pain, and spondylolisthesis of lumbar region. He underwent the procedure listed above on 1/24/2023. Hospital Course:  After surgery, His pre-operative leg pain was Absent . He complained of surgical incision pain in back. The pain was well-controlled on oral medications. The incision was clean, dry and intact. There was no erythema or edema around the surgical site. Prior to discharge He was eating well, urinating and ambulating with a steady gait.     Discharge Vitals/Labs:  BP (!) 173/91   Pulse 75   Temp 98 °F (36.7 °C) (Oral)   Resp 25   Ht 5' 9\" (1.753 m)   Wt 292 lb 8.8 oz (132.7 kg)   SpO2 96%   BMI 43.20 kg/m²   CBC with Differential:    Lab Results   Component Value Date/Time    WBC 12.6 01/25/2023 05:30 AM    RBC 5.06 01/25/2023 05:30 AM    HGB 11.8 01/25/2023 05:30 AM    HCT 37.7 01/25/2023 05:30 AM     01/25/2023 05:30 AM    MCV 74.5 01/25/2023 05:30 AM    MCH 23.4 01/25/2023 05:30 AM    MCHC 31.4 01/25/2023 05:30 AM    RDW 16.2 01/25/2023 05:30 AM    LYMPHOPCT 30.9 01/10/2023 10:18 AM    MONOPCT 8.1 01/10/2023 10:18 AM    BASOPCT 0.3 01/10/2023 10:18 AM    MONOSABS 0.7 01/10/2023 10:18 AM    LYMPHSABS 2.5 01/10/2023 10:18 AM    EOSABS 0.1 01/10/2023 10:18 AM    BASOSABS 0.0 01/10/2023 10:18 AM     CMP:    Lab Results   Component Value Date/Time     01/25/2023 05:30 AM    K 4.3 01/25/2023 05:30 AM     01/25/2023 05:30 AM    CO2 26 01/25/2023 05:30 AM    BUN 16 01/25/2023 05:30 AM    CREATININE 1.1 01/25/2023 05:30 AM    AGRATIO 1.6 01/10/2023 10:18 AM    LABGLOM >60 01/25/2023 05:30 AM    GLUCOSE 141 01/25/2023 05:30 AM    PROT 6.9 01/10/2023 10:18 AM    LABALBU 4.2 01/10/2023 10:18 AM    CALCIUM 9.7 01/25/2023 05:30 AM    BILITOT 0.3 01/10/2023 10:18 AM    ALKPHOS 83 01/10/2023 10:18 AM    AST 20 01/10/2023 10:18 AM    ALT 37 01/10/2023 10:18 AM       Discharge Medications: The patient suffers from a major neurological surgery that pain cannot be managed within an average of 30 MED per day. Severe acute postoperative pain is the reason for exceeding the 30 MED average, and the prescription reflects the same dosage patient received while inpatient, which is the lowest dose consistent with the patients medical condition. Non-opioid treatment options have been considered prior to prescribing opioids, and the patient has been advised of the benefits and risks of the opioid (including the potential for addiction). Medication List        START taking these medications      bisacodyl 5 MG EC tablet  Commonly known as: DULCOLAX  Take 1 tablet by mouth daily as needed for Constipation     methocarbamol 750 MG tablet  Commonly known as: ROBAXIN  Take 1 tablet by mouth every 8 hours as needed (muscle spasms)     oxyCODONE 5 MG immediate release tablet  Commonly known as: ROXICODONE  Take 1-2 tablets by mouth every 6 hours as needed for Pain for up to 7 days. Max Daily Amount: 40 mg     polyethylene glycol 17 g packet  Commonly known as: GLYCOLAX  Take 17 g by mouth daily for 10 days  Start taking on: January 26, 2023            CHANGE how you take these medications      warfarin 5 MG tablet  Commonly known as: COUMADIN  TAKE 2 TABS TUES, THURS, SAT AND SUN AND 3 TABS MON, WED, FRI OR AS DIRECTED  Start taking on: February 7, 2023  What changed: These instructions start on February 7, 2023.  If you are unsure what to do until then, ask your doctor or other care provider. CONTINUE taking these medications      acyclovir 400 MG tablet  Commonly known as: ZOVIRAX  TAKE 1 TABLET BY MOUTH TWICE DAILY     atorvastatin 40 MG tablet  Commonly known as: LIPITOR     chlorhexidine 0.12 % solution  Commonly known as: PERIDEX     losartan 100 MG tablet  Commonly known as: COZAAR     metFORMIN 500 MG tablet  Commonly known as: GLUCOPHAGE  TAKE 1 TABLET BY MOUTH EVERY DAY     metoprolol tartrate 50 MG tablet  Commonly known as: LOPRESSOR               Where to Get Your Medications        You can get these medications from any pharmacy    Bring a paper prescription for each of these medications  bisacodyl 5 MG EC tablet  methocarbamol 750 MG tablet  oxyCODONE 5 MG immediate release tablet  polyethylene glycol 17 g packet  warfarin 5 MG tablet         Discharge Destination:  The patient was discharged to Home. Follow-up:  The patient is to follow-up with Roman Mackey MD in the office in 2 weeks      Discharge Instructions:   Verbal and written discharge instructions were given to the patient at the time of discharge. Electronically signed by:  KAMALA Hills NP 1/25/2023 1:56 PM  [phone]

## 2023-01-25 NOTE — PROGRESS NOTES
NEUROSURGERY POST-OP PROGRESS NOTE    Patient Name: Vikash Godinez YOB: 1970   Sex: Male Age: 46 yrs     Medical Record Number: [id number] Acct Number: [de-identified]   Room Number: 8662/5602-50 Hospital Day: Hospital Day: 2     Interval History:  Post-operative Day# 1 s/p  L3-5 ANTERIOR LUMBAR INTERBODY FUSION WITH PEDICLE SCREW FIXATION with Dr. Skye Duff     Subjective:  Patient reports mild incisional pain in bed, legs feel better especially after ambulating. Has some numbness behind left knee. Objective:    VITAL SIGNS   BP (!) 173/91   Pulse 80   Temp 98 °F (36.7 °C) (Oral)   Resp 18   Ht 5' 9\" (1.753 m)   Wt 292 lb 8.8 oz (132.7 kg)   SpO2 96%   BMI 43.20 kg/m²    Height Height: 5' 9\" (175.3 cm)   Weight Weight: 292 lb 8.8 oz (132.7 kg)        Allergies No Known Allergies   NPO Status ADULT DIET;  Regular; 3 carb choices (45 gm/meal)   Isolation No active isolations     LABS   Basic Metabolic Profile Recent Labs     01/25/23  0530         CO2 26   BUN 16   CREATININE 1.1   GLUCOSE 141*      Complete Blood Count Recent Labs     01/25/23  0530   WBC 12.6*   RBC 5.06      Coagulation Studies Recent Labs     01/24/23  0920   INR 1.15*        MEDICATIONS   Inpatient Medications     acyclovir, 400 mg, Oral, BID    atorvastatin, 40 mg, Oral, Daily    losartan, 100 mg, Oral, Daily    sodium chloride flush, 5-40 mL, IntraVENous, 2 times per day    acetaminophen, 650 mg, Oral, Q6H    polyethylene glycol, 17 g, Oral, Daily    insulin lispro, 0-4 Units, SubCUTAneous, TID WC    insulin lispro, 0-4 Units, SubCUTAneous, Nightly   Infusions    sodium chloride      dextrose        Antibiotics   Recent Abx Admin                     acyclovir (ZOVIRAX) tablet 400 mg (mg) 400 mg Given 01/25/23 0917     400 mg Given 01/24/23 2024    ceFAZolin (ANCEF) 3,000 mg in dextrose 5 % 100 mL IVPB (mg) 3,000 mg New Bag 01/25/23 0336     3,000 mg New Bag 01/24/23 2005                     Neurologic Exam:    Mental status: awake/alert, oriented x4    Musculoskeletal:   Gait: Not tested   Tone: normal   Sensory: intact to light touch   Motor strength:    Right  Left    Right  Left    Deltoid  5 5  Hip Flex  5 5   Biceps  5 5  Knee Extensors  5 5   Triceps  5 5  Knee Flexors  5 5   Wrist Ext  5 5  Ankle Dorsiflex. 5 5   Wrist Flex  5 5  Ankle Plantarflex. 5 5   Handgrip  5 5  Ext Yousif Longus  5 5   Thumb Ext  5 5         Incision: all incisions dermabond HOLLEY c/d/I     Respiratory:  Unlabored respiratory pattern    Abdomen:   Soft, ND   Last BM preop   +BS    Cardiovascular:  Warm, well perfused    Assessment   45 yo male POD 1 s/p L3-5 ANTERIOR LUMBAR INTERBODY FUSION WITH PEDICLE SCREW FIXATION with Dr. Talib Sifuentes:  Neurologic exam frequency: q 4   Mobility: PT/OT, activity as tolerated   Diet: ADAT  Antibiotics: ancef post op complete   Viramontes: removed, voiding   DVT Prophylaxis: SCDs    Bowel Regimen: senokot, glycolax   Pain control: tylenol, morphine, roxicodone  Incisional Care: cleanse daily with soap/water, pat dry with clean towel and paint with CHG swab   Dispo Planning: dc home later today     Patient was seen and examined with Dr. Salma Gordillo who agrees with above assessment and plan. Electronically signed by:  KAMALA Woods NP, 1/25/2023 1:30 PM   Neurosurgery Nurse Practitioner  [phone]

## 2023-01-25 NOTE — CONSULTS
Hospitalist Consultation Note    Patient's PCP: Dr. Vicky Israel, APRN - CNP     Requesting Physician: Dr. Lukas Lobato MD    Reason for Consultation: Assistance with medical management    HISTORY OF PRESENT ILLNESS:    This is a very pleasant 46 y.o. male with paroxysmal atrial fibrillation on warfarin, hyperlipidemia, DM2, LOBO on CPAP at home, morbid obesity, chronic low back pain, lumbar radiculopathy, lumbar foraminal stenosis L3-4, lumbar spondylolisthesis, who presented to the hospital for surgical management. Was in the OR 1/24/2023 for elective L3-5 anterior lumbar interbody fusion with pedicle screw fixation. While in the PACU, he was noted to be increasingly agitated and combative. He received 2 mg of versed, 5 mg haldol x 2 without improvement in symptoms. ABG was done which showed 7.35/45.3/96. 4. He was started on precedex gtt and transferred to the ICU. He reports pain at op site (abdomen and back). He denies  chest pain, nausea, fevers, SOB, cough, constipation, or diarrhea. Reviewed vitals and labs. Past Medical / Surgical History:    Past Medical History:   Diagnosis Date    A-fib [facility])     Anesthesia     PATIENT STATES HAS NEVER HAD BEFORE AND NO FAMILY HISTORY OF PROBLEMS WITH ANESTHESIA    Diabetes mellitus ([facility] [address]     Herpes     Hyperlipidemia     Hypertension     LOBO on CPAP     Wears glasses     Wears partial dentures     LOWER       Past Surgical History:   Procedure Laterality Date    INSERTABLE CARDIAC MONITOR      LOOP RECORDER       Medications Prior to Admission:    No current facility-administered medications on file prior to encounter. Current Outpatient Medications on File Prior to Encounter   Medication Sig Dispense Refill    metoprolol tartrate (LOPRESSOR) 50 MG tablet TAKE 1 TAB BY MOUTH THE NIGHT BEFORE THE PROCEDURE AND 1 HOUR PRIOR TO PROCEDURE TIME. Monie Rivero       atorvastatin (LIPITOR) 40 MG tablet TAKE 1 TABLET BY MOUTH EVERY DAY      losartan (COZAAR) 100 MG tablet TAKE 1 TABLET BY MOUTH EVERY DAY      warfarin (COUMADIN) 5 MG tablet TAKE 2 TABS TUES, THURS, SAT AND SUN AND 3 TABS MON, WED, FRI OR AS DIRECTED         Allergies:  Patient has no known allergies. Social History:   TOBACCO:   reports that he quit smoking about 16 years ago. His smoking use included cigarettes. He started smoking about 33 years ago. He has a 10.00 pack-year smoking history. He has been exposed to tobacco smoke. He has never used smokeless tobacco.     ETOH:   reports that he does not currently use alcohol. Family History:   History reviewed. No pertinent family history. ROS: Review of Systems - Negative except as in HPI. All other systems reviewed and are negative. PHYSICAL EXAM:  /82   Pulse 75   Temp 98.4 °F (36.9 °C) (Oral)   Resp 18   Ht 5' 9\" (1.753 m)   Wt 292 lb 8.8 oz (132.7 kg)   SpO2 99%   BMI 43.20 kg/m²     Recent Labs     01/24/23  0926 01/24/23  1410 01/24/23  2124   POCGLU 128* 150* 156*     General: He is not in acute distress. Appearance: He is obese. HENT:      Head: Normocephalic. Eyes:      Pupils: Pupils are equal, round, and reactive to light. Cardiovascular:      Rate and Rhythm: Normal rate. Pulses: Normal pulses. Pulmonary:      Effort: Pulmonary effort is normal.      Breath sounds: Normal breath sounds. No wheezing. Comments: examination limited by obesity, breath sounds distant   Abdominal:      General: Bowel sounds are normal.      Palpations: Abdomen is soft. Musculoskeletal:         General: No tenderness. Right lower leg: No edema. Left lower leg: No edema. Skin:     General: Skin is warm and dry. Capillary Refill: Capillary refill takes less than 2 seconds. Coloration: Skin is not jaundiced. Neurological:      Mental Status: He is alert and oriented to person, place, and time.    Psychiatric:         Mood and Affect: Mood normal.          LABS:  No results for input(s): WBC, HGB, HCT, PLT in the last 72 hours. No results for input(s): NA, K, CL, CO2, BUN, CREATININE, GLUCOSE in the last 72 hours. Invalid input(s):  CA,  PHOS  No results for input(s): AST, ALT, ALB, BILITOT, ALKPHOS in the last 72 hours. No results for input(s): TROPONINI in the last 72 hours. No results for input(s): BNP in the last 72 hours. No results for input(s): CHOL, HDL in the last 72 hours. Invalid input(s): LDLCALCU  Recent Labs     01/24/23  0920   INR 1.15*     No results for input(s): NITRITE, COLORU, PHUR, LABCAST, WBCUA, RBCUA, MUCUS, TRICHOMONAS, YEAST, BACTERIA, CLARITYU, SPECGRAV, LEUKOCYTESUR, UROBILINOGEN, BILIRUBINUR, BLOODU, GLUCOSEU, AMORPHOUS in the last 72 hours. Invalid input(s): Naz Nash       Assessment & Plan: This is a very pleasant 46 y.o. male        Acute on chronic back pain sec to Lumbar spondylolisthesis with radiculopathy; Lumbar foraminal stenosis L3-4  -S/p L3-5 anterior lumbar interbody fusion with pedicle screw fixation  -Pain mgt per primary   - Dvt prophylaxis      Acute metabolic encephalopathy  - Immediate post -op noted increasingly to be  agitated while in recovery in the PACU and was admitted to the ICU for precedex gtt   -On precedex gtt for agitation, wean as tolerated   -Tx from /icu when off Precedex       DM2   -HbA1c 6.3 10/25/22  -SSI/hypoglycemic protocol/POC glucose checks      Paroxysmal Afib on warfarin  -CHADSVASc 2   -Warfarin currently held since 5 days for surgery   - Resume Oac when Ok with Surery       Mixed Hyperlipidemia   Hypertension   -Monitor BP      LOBO   -CPAP nightly and PRN       Thank you Dr. Rae Vilchis MD for the opportunity to be involved in this patients care. If you have any questions or concerns please feel free to contact me at [phone].

## 2023-01-25 NOTE — PROGRESS NOTES
Pt ))B ambulate in hallway >200 feet/ ordering breakfast/ pt states he still has some pain off and on behind left knee running 7/10 pt states helps when he walks/ same as at home/ otherwise hemodynamically stable/ see notes/ flow sheet    [id number] Round with Dr Navdeep Ordonez ICU Team/ transfer to step down care/ DC IVFs/ intake 100% breakfast/ oob ad joseph/ suzanne orders    [address] sister here to  pt/ DC instructions explained with understanding/ meds brought to bedside from [facility] out pt pharmacy/ escort pt via wheelchair with all belongings to sisters vehicle

## 2023-01-25 NOTE — CARE COORDINATION
Case management is following for discharge disposition. The chart was reviewed. Mr. Gaytan Room is from home. He is independent with self care and functional mobility. Please contact CM will concerns or needs.     Electronically signed by MONTY Reaves RN-[facility]  Case Management  [phone]

## 2023-01-25 NOTE — DISCHARGE INSTR - COC
Continuity of Care Form    Patient Name: Dylon Marroquin   :  1970  MRN:  [id number]    Admit date:  2023  Discharge date:  2023    Code Status Order: Full Code   Advance Directives:   [facility] Documentation       Date/Time Healthcare Directive Type of Healthcare Directive Copy in [address] [facility] [address] Agent's Name Healthcare Agent's Phone Number    [phone] No, patient does not have an advance directive for healthcare treatment -- -- -- -- --            Admitting Physician:  Nathaniel Palmer MD  PCP: Geradine Libman, APRN - CNP    Discharging Nurse: [facility]/Room#: 5234/0872-81  Discharging Unit Phone Number:     Emergency Contact:   Extended Emergency Contact Information  Primary Emergency Contact: [address] [place] [address] Phone: [phone]  Relation: Brother/Sister  Secondary Emergency Contact: [address] [facility] [address], [place]  Home Phone: [phone]  Relation: Parent    Past Surgical History:  Past Surgical History:   Procedure Laterality Date    INSERTABLE CARDIAC MONITOR      LOOP RECORDER    LUMBAR FUSION N/A 2023    L3-5 ANTERIOR LUMBAR INTERBODY FUSION WITH PEDICLE SCREW FIXATION performed by Nathaniel Palmer MD at [address] OR       Immunization History:   Immunization History   Administered Date(s) Administered    COVID-19, PFIZER PURPLE top, DILUTE for use, (age 15 y+), 30mcg/0.3mL 2021, 05/10/2021       Active Problems:  Patient Active Problem List   Diagnosis Code    Type 2 diabetes mellitus without complication, without long-term current use of insulin ([facility] [address] E11.9    Essential hypertension I10    LOBO on CPAP G47.33, Z99.89    Mixed hyperlipidemia E78.2    HSV infection B00.9    Paroxysmal A-fib (HCC) I48.0    Lumbar back pain with radiculopathy affecting right lower extremity M54.16    Marijuana use F12.90    Lumbar foraminal stenosis M48.061    Low HDL (under 40) E78.6 Spondylolisthesis of lumbar region M43.16    S/P lumbar fusion Z98.1    Agitation R45.1       Isolation/Infection:   Isolation            No Isolation          Patient Infection Status       None to display            Nurse Assessment:  Last Vital Signs: BP (!) 173/91   Pulse 75   Temp 98 °F (36.7 °C) (Oral)   Resp 25   Ht 5' 9\" (1.753 m)   Wt 292 lb 8.8 oz (132.7 kg)   SpO2 96%   BMI 43.20 kg/m²     Last documented pain score (0-10 scale): Pain Level: 5  Last Weight:   Wt Readings from Last 1 Encounters:   01/24/23 292 lb 8.8 oz (132.7 kg)     Mental Status:      IV Access:      Nursing Mobility/ADLs:  Walking     Transfer    Bathing    Dressing    Toileting    Feeding    Med Admin    Med Delivery       Wound Care Documentation and Therapy:  Incision 01/24/23 Abdomen Medial;Upper (Active)   Dressing Status Clean;Dry; Intact 01/25/23 1130   Dressing/Treatment Open to air 01/25/23 1130   Closure Surgical glue 01/25/23 1130   Margins Approximated 01/25/23 1130   Incision Assessment Dry 01/25/23 1130   Drainage Amount None 01/25/23 1130   Odor None 01/25/23 1130   Number of days: 1       Incision 01/24/23 Back Left; Lower;Medial (Active)   Dressing Status Clean;Dry; Intact 01/25/23 1130   Dressing/Treatment Open to air 01/25/23 1130   Closure Surgical glue 01/25/23 1130   Margins Approximated 01/25/23 1130   Incision Assessment Dry 01/25/23 1130   Drainage Amount None 01/25/23 1130   Odor None 01/25/23 1130   Number of days: 1       Incision 01/24/23 Back Lower;Medial;Right (Active)   Dressing Status Clean;Dry; Intact 01/25/23 1130   Dressing/Treatment Open to air 01/25/23 1130   Closure Surgical glue 01/25/23 1130   Margins Approximated 01/25/23 1130   Incision Assessment Dry 01/25/23 1130   Drainage Amount None 01/25/23 1130   Odor None 01/25/23 1130   Number of days: 1       Incision 01/24/23 Back Lower;Medial;Right (Active)   Dressing Status Clean;Dry; Intact 01/25/23 1130   Dressing/Treatment Open to air 01/25/23 1130   Closure Surgical glue 01/25/23 1130   Margins Approximated 01/25/23 1130   Incision Assessment Dry 01/25/23 1130   Drainage Amount None 01/25/23 1130   Odor None 01/25/23 1130   Number of days: 1        Elimination:  Continence: Bowel:   Bladder:   Urinary Catheter:    Colostomy/Ileostomy/Ileal Conduit:        Date of Last BM: 1/24/2023    Intake/Output Summary (Last 24 hours) at 1/25/2023 1521  Last data filed at 1/25/2023 1300  Gross per 24 hour   Intake 2578.96 ml   Output 3060 ml   Net -481.04 ml     I/O last 3 completed shifts: In: 3810.6 [P.O.:20; I.V.:3680.6; IV Piggyback:110]  Out: 2035 [Urine:2010; Blood:25]    Safety Concerns:         Impairments/Disabilities:          Nutrition Therapy:  Current Nutrition Therapy:       Routes of Feeding:   Liquids:   Daily Fluid Restriction:   Last Modified Barium Swallow with Video (Video Swallowing Test):     Treatments at the Time of Hospital Discharge:   Respiratory Treatments:   Oxygen Therapy:    Ventilator:       Rehab Therapies:   Weight Bearing Status/Restrictions:    Other Medical Equipment (for information only, NOT a DME order):  front wheel walker  Other Treatments: ***    Patient's personal belongings (please select all that are sent with patient):  Cell phone    RN SIGNATURE:  maddie    CASE MANAGEMENT/SOCIAL WORK SECTION    Inpatient Status Date: ***    Readmission Risk Assessment Score:  Readmission Risk              Risk of Unplanned Readmission:  13           Discharging to Facility/ Agency   Name:   Address:  Phone:  Fax:    Dialysis Facility (if applicable)   Name:  Address:  Dialysis Schedule:  Phone:  Fax:    / signature: {Esignature:[id number]}    PHYSICIAN SECTION    Prognosis: {Prognosis:9763094057}    Condition at Discharge: [address] Patient Condition:[id number]}    Rehab Potential (if transferring to Rehab): {Prognosis:5699421203}    Recommended Labs or Other Treatments After Discharge: ***    Physician Certification: I certify the above information and transfer of Manuel Sandoval  is necessary for the continuing treatment of the diagnosis listed and that he requires {Admit to Appropriate Level of Care:55276} for {GREATER/LESS:255981354} 30 days.      Update Admission H&P: {ROMEROP DME Changes in ENWRK:378721047}    PHYSICIAN SIGNATURE:  {Esignature:[id number]}

## 2023-01-25 NOTE — PROGRESS NOTES
Physical Therapy  Facility/Department: [facility] ICU  Physical Therapy Initial Assessment and Treatment    Name: Charlie Sanchez  : 1970  MRN: [id number]  Date of Service: 2023    Discharge Recommendations:  Charlie Sanchez scored a  18/24 on the AM-PAC short mobility form. Current research shows that an AM-PAC score of 18 or greater is typically associated with a discharge to the patient's home setting. Based on the patient's AM-PAC score and their current functional mobility deficits, it is recommended that the patient have 2-3 sessions per week of Physical Therapy at d/c to increase the patient's independence. At this time, this patient demonstrates the endurance and safety to discharge home with home services) and a follow up treatment frequency of 2-3x/wk. Please see assessment section for further patient specific details. If patient discharges prior to next session this note will serve as a discharge summary. Please see below for the latest assessment towards goals. Home with Home health PT   PT Equipment Recommendations  Mobility Devices: Alek Lutz: Mann      Patient Diagnosis(es): There were no encounter diagnoses. Past Medical History:  has a past medical history of A-fib ([address] Anesthesia, Diabetes mellitus ([address] Herpes, Hyperlipidemia, Hypertension, LOBO on CPAP, Wears glasses, and Wears partial dentures. Past Surgical History:  has a past surgical history that includes Insertable Cardiac Monitor and lumbar fusion (N/A, 2023). Assessment   Assessment: Pt is 45 yo M admitted on 23 with lumbar spondylolisthesis and underwent back surgery. Normally, pt lives in 2 story apt and cares for himself. Presently, pt moving with assist of 1 with and without AD and SBA. Pt able to ambulate up and down 1 flight stairs with rail and SBA. Rec home at d/c with 24 hour assist initially and home PT. Rec wheeled walker for home use. Will follow.   Treatment Diagnosis: Decreased functional mobility  Therapy Prognosis: Good  Decision Making: Medium Complexity  Requires PT Follow-Up: Yes  Activity Tolerance  Activity Tolerance: Patient tolerated treatment well     Plan   Physcial Therapy Plan  General Plan:  (2-5)  Current Treatment Recommendations: Strengthening, Balance training, Functional mobility training, Transfer training, Gait training, Safety education & training, Therapeutic activities  Safety Devices  Type of Devices: Nurse notified, Call light within reach, Left in chair, Chair alarm in place     Restrictions  Position Activity Restriction  Other position/activity restrictions: ambulate     Subjective   Pain: Denies at rest  General  Chart Reviewed: Yes  Additional Pertinent Hx: Pt is 45 yo M admitted on 1/24/23 with spondylolisthesis of lumbar region. Pt admitted 1/24 s/p elective L3-5 anterior lumbar interbody fusion with pedicle screw fixation. Post-operatively, pt agitated in PACU. Tranferred to ICU for management of acute metabolic encephalopathy, started on precedex. Family / Caregiver Present: No  Referring Practitioner: Liz  Diagnosis: Spondylolisthesis of lumbar region  Subjective  Subjective: Pt sitting EOB with OT present. Agreeable to PT.     Social/Functional History  Social/Functional History  Lives With: Alone  Type of Home: Apartment  Home Layout: Two level, Bed/Bath upstairs (recliner on 2nd floor; no bathroom on 1st floor)  Home Access: Stairs to enter without rails  Entrance Stairs - Number of Steps: 1 +1 GABY  Bathroom Shower/Tub: Tub/Shower unit  Bathroom Toilet: Standard  Has the patient had two or more falls in the past year or any fall with injury in the past year?: No  ADL Assistance: Independent  Homemaking Assistance: Independent  Ambulation Assistance: Independent  Transfer Assistance: Independent  Active : Yes  Occupation: On disability  Leisure & Hobbies: visiting friends  Additional Comments: brother and daughter plan to provide initial 24hr assist    Vision/Hearing  Vision  Vision: Within Functional Limits (his reading glasses broke)  Hearing  Hearing: Within functional limits      Cognition   Orientation  Overall Orientation Status: Within Normal Limits  Cognition  Overall Cognitive Status: WNL     Objective   AROM RLE (degrees)  RLE AROM: WFL  AROM LLE (degrees)  LLE AROM : WFL    Strength RLE  Comment: >3/5  Strength LLE  Comment: >3/5    Bed mobility  Supine to Sit: Stand by assistance  Scooting: Supervision  Bed Mobility Comments: Using log roll    Transfers  Sit to Stand: Stand by assistance  Stand to Sit: Stand by assistance    Ambulation  Device: Rolling Walker  Assistance: Stand by assistance  Gait Deviations: Slow Sybil;Increased DEBI;Decreased step length;Decreased step height  Distance: 40 feet  Ambulation 2  Assistance 2: Contact guard assistance;Stand by assistance  Gait Deviations: Slow Sybil;Increased DEBI;Decreased step length;Decreased step height  Distance: 100 feet    Stairs  # Steps : 14  Stairs Height: 6\"  Rails: Right ascending  Assistance: Stand by assistance  Comment: Step to pattern    Balance  Sitting - Static: Good  Standing - Static: Fair  Standing - Dynamic: Fair    Treatment:  Functional mobility training and pt education     Goals  Short Term Goals  Time Frame for Short Term Goals: by discharge  Short Term Goal 1: Pt will transfer sit to and from stand with supervision  Short Term Goal 2: Pt will ambulate 400 feet with or without AD and SBA  Short Term Goal 3: Pt will tolerate bed mobility assessment    Education  Patient Education  Education Given To: Patient  Education Provided: Role of Therapy;Precautions  Education Outcome: Verbalized understanding    Therapy Time   Individual Concurrent Group Co-treatment   Time In 0900         Time Out 0923         Minutes 23             Timed Code Treatment Minutes:   8    Total Treatment Minutes:   23    Brigette Dillard PT

## 2023-01-25 NOTE — PROGRESS NOTES
Occupational Therapy  Facility/Department: [address] [facility] ICU  Occupational Therapy Initial Assessment/Treatment    Name: Navdeep Krause  : 1970  MRN: [id number]  Date of Service: 2023    Discharge Recommendations:  Navdeep Krause scored a 19/24 on the AM-PAC ADL Inpatient form. Current research shows that an AM-PAC score of 18 or greater is typically associated with a discharge to the patient's home setting. Please see assessment section for further patient specific details. If patient discharges prior to next session this note will serve as a discharge summary. Please see below for the latest assessment towards goals. OT Equipment Recommendations  Equipment Needed: Yes  Other: shower chair, reacher       Patient Diagnosis(es): There were no encounter diagnoses. Past Medical History:  has a past medical history of A-fib ([facility] [address] Anesthesia, Diabetes mellitus ([facility] [address] Herpes, Hyperlipidemia, Hypertension, LOBO on CPAP, Wears glasses, and Wears partial dentures. Past Surgical History:  has a past surgical history that includes Insertable Cardiac Monitor and lumbar fusion (N/A, 2023). Treatment Diagnosis: Impaired ADLs and mobility      Assessment   Performance deficits / Impairments: Decreased functional mobility ; Decreased ADL status  Assessment: Pt from home independently. Demo a functional decline in ADLs and mobility due to pain/stiffness and spinal precautions following lumbar sx. Pt demo adherence to spinal precautions during ADLs, demo mobility at [address] with use of walker for pain control. Anticipate pt will continue to progress well with increased activity. Anticipate no skilled OT needs at d/c. Pt has initial 24hr assist at home.   Treatment Diagnosis: Impaired ADLs and mobility  Prognosis: Good  Decision Making: Medium Complexity  REQUIRES OT FOLLOW-UP: Yes  Activity Tolerance  Activity Tolerance: Patient Tolerated treatment well        Plan   Occupational Therapy Plan  Times Per Week: 5-7  Current Treatment Recommendations: Self-Care / ADL, Safety education & training, Functional mobility training, Endurance training, Patient/Caregiver education & training     Restrictions  Position Activity Restriction  Other position/activity restrictions: ambulate    Subjective   General  Chart Reviewed: Yes  Additional Pertinent Hx: Pt admitted 1/24 s/p elective L3-5 anterior lumbar interbody fusion with pedicle screw fixation. Post-operatively, pt agitated in PACU. Tranferred to ICU for management of acute metabolic encephalopathy, started on precedex  Family / Caregiver Present: No  Referring Practitioner: SAL Severino  Diagnosis: spondylolisthesis  Subjective  Subjective: Pt in bed upon OT entry. Agreeable to therapy. Has walked with staff twice since sx. Reports 5/10 pain L knee and back- recently medicated       Social/Functional History  Social/Functional History  Lives With: Alone  Type of Home: Apartment  Home Layout: Two level, Bed/Bath upstairs (recliner on 2nd floor; no bathroom on 1st floor)  Home Access: Stairs to enter without rails  Entrance Stairs - Number of Steps: 1 +1 GABY  Bathroom Shower/Tub: Tub/Shower unit  Bathroom Toilet: Standard  Has the patient had two or more falls in the past year or any fall with injury in the past year?: No  ADL Assistance: Independent  Homemaking Assistance: Independent  Ambulation Assistance: Independent  Transfer Assistance: Independent  Active : Yes  Occupation: On disability  Leisure & Hobbies: visiting friends  Additional Comments: brother and daughter plan to provide initial 24hr assist       Objective     Toilet Transfers  Toilet - Technique: Ambulating  Equipment Used: Standard toilet  Toilet Transfer: Stand by assistance    AROM: Within functional limits  Strength:  Within functional limits  Coordination: Within functional limits  Sensation: Intact    ADL  Feeding: Independent  Grooming: Stand by assistance (stance at sink to brush teeth)  LE Dressing: Stand by assistance (don underwear (seated, LE figure 4))  Toileting: Stand by assistance (stance at sink to urinate, clothing mgmt)  Comment: Pt educated on DME recommendations and modified ADLs to maintain spinal precautions and for increased safety/independence with ADLs. Educated on safe shower transfer technique with initial family assist- pt plans to sponge bathe initially. Has a long handled sponge, may borrow a shower chair            Bed mobility  Supine to Sit: Stand by assistance (via log roll, HOB flat)  Scooting: Supervision    Transfers  Sit to stand: Stand by assistance (bed, chair)  Stand to sit: Stand by assistance (chair x2)  Transfer Comments: Pt demo safe hand placement during transfers using walker    Functional Mobility  Equipment: Rolling Walker  Level of Assist: CGa progressing to SBA  Distance: to/from bathroom, hallway 40'  Comment: also completed steps with rail x1 and SBA; trialed mobility 40' using no AD- pt steady, slower pace. Pt states preference for RW at this time to assist pain control    Vision  Vision: Within Functional Limits (his reading glasses broke)  Hearing  Hearing: Within functional limits  Cognition  Overall Cognitive Status: WNL  Orientation  Overall Orientation Status: Within Normal Limits         Education Given To: Patient  Education Provided: Role of Therapy;Plan of Care;Precautions; Equipment; Fall Prevention Strategies;Transfer Training;ADL Adaptive Strategies  Education Provided Comments: spinal precautions  Education Method: Demonstration;Verbal  Education Outcome: Verbalized understanding;Demonstrated understanding        Safety Devices  Type of Devices: Nurse notified;Call light within reach; Left in chair;Chair alarm in place     AM-PAC Score        AM-PAC Inpatient Daily Activity Raw Score: 19 (01/25/23 1014)  AM-PAC Inpatient ADL T-Scale Score : 40.22 (01/25/23 1014)  ADL Inpatient CMS 0-100% Score: 42.8 (01/25/23 1014)  ADL Inpatient CMS G-Code Modifier : CK (01/25/23 1014)    Tinneti Score       Goals  Short Term Goals  Time Frame for Short Term Goals: discharge  Short Term Goal 1: mod I toilet transfer  Short Term Goal 2: mod I LB dressing while maintaining spinal precautions  Short Term Goal 3: tolerate standing 10 minutes for ADLs/mobility for ADLs       Therapy Time   Individual Concurrent Group Co-treatment   Time In 0830         Time Out 0918         Minutes 48         Timed Code Treatment Minutes: [address], OT

## 2023-01-25 NOTE — PLAN OF CARE
Problem: Chronic Conditions and Co-morbidities  Goal: Patient's chronic conditions and co-morbidity symptoms are monitored and maintained or improved  Outcome: Completed  Flowsheets (Taken 1/25/2023 3864)  Care Plan - Patient's Chronic Conditions and Co-Morbidity Symptoms are Monitored and Maintained or Improved:   Monitor and assess patient's chronic conditions and comorbid symptoms for stability, deterioration, or improvement   Collaborate with multidisciplinary team to address chronic and comorbid conditions and prevent exacerbation or deterioration   Update acute care plan with appropriate goals if chronic or comorbid symptoms are exacerbated and prevent overall improvement and discharge     Problem: Discharge Planning  Goal: Discharge to home or other facility with appropriate resources  Outcome: Completed  Flowsheets (Taken 1/25/2023 2649)  Discharge to home or other facility with appropriate resources:   Identify barriers to discharge with patient and caregiver   Arrange for needed discharge resources and transportation as appropriate   Identify discharge learning needs (meds, wound care, etc)   Arrange for interpreters to assist at discharge as needed     Problem: Safety - Adult  Goal: Free from fall injury  Outcome: Completed     Problem: Pain  Goal: Verbalizes/displays adequate comfort level or baseline comfort level  1/25/2023 1517 by John Philip, RN  Outcome: Completed  1/25/2023 0521 by Vidya Huffman, RN  Outcome: Progressing     Problem: ABCDS Injury Assessment  Goal: Absence of physical injury  Outcome: Completed

## 2023-01-25 NOTE — PLAN OF CARE
Problem: Chronic Conditions and Co-morbidities  Goal: Patient's chronic conditions and co-morbidity symptoms are monitored and maintained or improved  Outcome: Completed  Flowsheets (Taken 1/25/2023 5236)  Care Plan - Patient's Chronic Conditions and Co-Morbidity Symptoms are Monitored and Maintained or Improved:   Monitor and assess patient's chronic conditions and comorbid symptoms for stability, deterioration, or improvement   Collaborate with multidisciplinary team to address chronic and comorbid conditions and prevent exacerbation or deterioration   Update acute care plan with appropriate goals if chronic or comorbid symptoms are exacerbated and prevent overall improvement and discharge     Problem: Discharge Planning  Goal: Discharge to home or other facility with appropriate resources  Outcome: Completed  Flowsheets (Taken 1/25/2023 8026)  Discharge to home or other facility with appropriate resources:   Identify barriers to discharge with patient and caregiver   Arrange for needed discharge resources and transportation as appropriate   Identify discharge learning needs (meds, wound care, etc)   Arrange for interpreters to assist at discharge as needed     Problem: Safety - Adult  Goal: Free from fall injury  Outcome: Completed     Problem: Pain  Goal: Verbalizes/displays adequate comfort level or baseline comfort level  1/25/2023 1517 by Bertin Chauhan, RN  Outcome: Completed  1/25/2023 0521 by Ana Chavarria RN  Outcome: Progressing     Problem: ABCDS Injury Assessment  Goal: Absence of physical injury  Outcome: Completed

## 2023-01-26 ENCOUNTER — TELEPHONE (OUTPATIENT)
Dept: FAMILY MEDICINE CLINIC | Age: 53
End: 2023-01-26

## 2023-01-26 NOTE — OP NOTE
Operative Note      Patient: Christina Richmond  YOB: 1970  MRN: [id number]    Date of Procedure: 1/24/2023           Pre-Op Diagnosis:  Lumbar spondylolisthesis  Lumbar spondylosis  Lumbar stenosis  Lumbar radiculopathy     Post-Op Diagnosis: Same       Surgeon(s):  MD Juarez Severino MD     PROCEDUREs:   Anterior Portion:    1. Anterior L3-4, L4-5 lumbar discectomy through retroperitoneal approach. 2. L3-4, L4-5 anterior lumbar interbody fusion with titanium graft filled with allograft and BMP 3. L3-4, L4-5 anterior lumbar fusion with Nuvasive integrated plate/screws  4. SSEP, EMG monitoring. 5. Intraoperative fluoroscopy   Posterior Portion:  1. L3-5 bilateral posterior pedicle screw fixation with ALphatec MIS pedicle screws/rods. 2. L3-5 posterolateral fusion with allograft 3. SSEP and EMG monitoring. 4. Intraoperative fluoroscopy. 5. O arm navigation  with stealth station      Cosurgeon: Juarez Aguiar MD for the Anterior Portion  Assistant: Mickey Pacheco PA-C. There were no residents available to assist with the case. Vance assistance was necessary due to complexity of the case and she assisted with exposure, closure, and placement of instrumentation     COMPLICATIONS:  None      ANESTHETIC:          General endotracheal.      ESTIMATED BLOOD LOSS:  100 cc. INDICATIONS:  Patient  is a 46 y.o. male  with low back pain and radicular pain with spondylolisthesis . Full informed consent of the operative risks, complications, and expected outcomes were discussed with the patient who fully acknowledged the understanding of these complications and consented to the operation. Operation     The patient was brought to the Operating Room. he received preoperative antibiotics with Ancef. Sequential compression boots were placed on the patients legs and activated. General endotracheal anesthesia was induced by the Anesthesia Team. Viramontes catheter was inserted.  SSEP and EMG monitoring was initiated. Lateral fluoroscopy was brought in and localized the L3-4, L4-5levels. Dr. Momo Melissa then performed the opening portion of the procedure. Once the retroperitoneal exposure had been obtained at the [facility] and Dr. Momo Melissa provided appropriate retraction of the vessels I scrubbed into the case. The midline of the disc at L4-5 was identified with AP fluoroscopy. The disc was incised with the 15 blade knife. Disc material was removed with pituitary rongeur. Endplates were curetted and all disc material was removed down to the posterior longitudinal ligament. Once the disc was emptied I rasped and scraped the endplates to prepare them for fusion. I then tested the trial and the 6 mm trial fit well. I then prepared the 8 mm titanium 15 degree alphatec interbody and filled this with  allograft and BMP . This was then tapped in so as to be appropriately countersunk as confirmed by lateral fluoroscopy. I then tapped through the olmos into the L5 endplates and then placed a screw through the olmos into the endplates under lateral fluoroscopy guidance with good purchase to the fully tightened and locked position. This process was then repeated at L4-5  and a titanium 10 degree implant with allograft and BMP was placed. Final lateral and AP fluoroscopy was obtained confirming good placement of all hardware including the interbody Grafts and plate and screws. Dr. Momo Melissa then performed the closure portion of the procedure. Posterior portion   Once the abdomen was closed the patient was carefully turned prone onto a Matt table. Again all pressure points were adequately padded and the patient was secured to the operating room table. The back was cleansed with alcohol and prepped and draped in the usual sterile fashion. A reference array was placed into the right PSIS and an o-arm spin  was performed. Bilateral paramedian incisions were planned for appropriate exposure.  A 10 blade knife was used to incise the skin and dermis. Bovie electrocautery was used to dissect through the subcutaneous tissue down to fascia. The fascia was split. Jamshidi needles with O-arm navigation  And stealth station 7 guidance were used to cannulate the pedicles followed by k-wires . I then tapped the screws and placed nuvasive pedicle screws from L3-5 bilaterally. A titanium noe was placed and provisionally tightened with locking caps followed by final tightening with antitorque device. This was our L3-5 posterior segmental fixation. Fluoroscopy confirmed excellent placement of the instrumentation. The pedicle screws did not stimulate above 20 on EMG. I then turned my attention towards decortication and decorticated the posterolateral elements and placed allograft fusion from L3-5. The wound was then closed with 0-Vicryl suture to approximate the fascia followed by 2-0 Vicryl suture to close the subcutaneous tissue and dermis followed by 4-0 Monocryl running subcuticular stitch to close the skin. DERMABOND was layered over the skin edges. All drapes were then removed. The patient was turned back to the hospital bed. He was awakened and taken to the Recovery Room in stable condition. All sponge, needle, and instrument counts were correct at the end of the case. I affirm in accordance with CMS regulations that I was present for all critical portions of the case.   Electronically signed by Shana Morales MD on 1/26/2023 at 7:18 AM

## 2023-01-26 NOTE — TELEPHONE ENCOUNTER
Care Transitions Initial Follow Up Call    Outreach made within 2 business days of discharge: Yes    Patient: Nay Fraire Patient : 1970   MRN: [id number]  Reason for Admission: There are no discharge diagnoses documented for the most recent discharge. Discharge Date: 23       Spoke with: PATIENT    Discharge department/facility: [facility]    TCM Interactive Patient Contact:  Was patient able to fill all prescriptions: Yes  Was patient instructed to bring all medications to the follow-up visit: Yes  Is patient taking all medications as directed in the discharge summary? Yes  Does patient understand their discharge instructions: Yes  Does patient have questions or concerns that need addressed prior to 7-14 day follow up office visit: no    Scheduled appointment with PCP within 7-14 days    Follow Up  Future Appointments   Date Time Provider Diaz Aguilar   2023  9:00 AM [facility]     SPOKE TO PT AND NO APPT NEEDED AT THIS TIME. HE SEES THE SURGEON ON 2023.   [address] [place] [address]    Aleks Bonilla MA

## 2023-02-03 DIAGNOSIS — E11.9 TYPE 2 DIABETES MELLITUS WITHOUT COMPLICATION, WITHOUT LONG-TERM CURRENT USE OF INSULIN (HCC): ICD-10-CM

## 2023-03-20 DIAGNOSIS — E11.9 TYPE 2 DIABETES MELLITUS WITHOUT COMPLICATION, WITHOUT LONG-TERM CURRENT USE OF INSULIN (HCC): ICD-10-CM

## 2023-04-25 ENCOUNTER — OFFICE VISIT (OUTPATIENT)
Dept: FAMILY MEDICINE CLINIC | Age: 53
End: 2023-04-25
Payer: COMMERCIAL

## 2023-04-25 VITALS
HEART RATE: 64 BPM | OXYGEN SATURATION: 98 % | BODY MASS INDEX: 45.11 KG/M2 | HEIGHT: 69 IN | RESPIRATION RATE: 18 BRPM | DIASTOLIC BLOOD PRESSURE: 84 MMHG | SYSTOLIC BLOOD PRESSURE: 138 MMHG | WEIGHT: 304.6 LBS

## 2023-04-25 DIAGNOSIS — E78.2 MIXED HYPERLIPIDEMIA: ICD-10-CM

## 2023-04-25 DIAGNOSIS — B35.1 ONYCHOMYCOSIS: ICD-10-CM

## 2023-04-25 DIAGNOSIS — E11.9 TYPE 2 DIABETES MELLITUS WITHOUT COMPLICATION, WITHOUT LONG-TERM CURRENT USE OF INSULIN (HCC): ICD-10-CM

## 2023-04-25 DIAGNOSIS — Z00.00 WELL ADULT EXAM: Primary | ICD-10-CM

## 2023-04-25 DIAGNOSIS — I10 ESSENTIAL HYPERTENSION: ICD-10-CM

## 2023-04-25 LAB
CREATININE URINE POCT: NORMAL
HBA1C MFR BLD: 6 %
MICROALBUMIN/CREAT 24H UR: NORMAL MG/G{CREAT}
MICROALBUMIN/CREAT UR-RTO: NORMAL

## 2023-04-25 PROCEDURE — 3075F SYST BP GE 130 - 139MM HG: CPT | Performed by: NURSE PRACTITIONER

## 2023-04-25 PROCEDURE — 82044 UR ALBUMIN SEMIQUANTITATIVE: CPT | Performed by: NURSE PRACTITIONER

## 2023-04-25 PROCEDURE — 99396 PREV VISIT EST AGE 40-64: CPT | Performed by: NURSE PRACTITIONER

## 2023-04-25 PROCEDURE — 83036 HEMOGLOBIN GLYCOSYLATED A1C: CPT | Performed by: NURSE PRACTITIONER

## 2023-04-25 PROCEDURE — 3079F DIAST BP 80-89 MM HG: CPT | Performed by: NURSE PRACTITIONER

## 2023-04-25 RX ORDER — GABAPENTIN 300 MG/1
300 CAPSULE ORAL 3 TIMES DAILY
COMMUNITY
Start: 2023-01-26 | End: 2023-04-25

## 2023-04-25 RX ORDER — TERBINAFINE HYDROCHLORIDE 250 MG/1
250 TABLET ORAL DAILY
Qty: 84 TABLET | Refills: 0 | Status: SHIPPED | OUTPATIENT
Start: 2023-04-25 | End: 2023-07-18

## 2023-04-25 RX ORDER — LANCETS 30 GAUGE
1 EACH MISCELLANEOUS DAILY
Qty: 100 EACH | Refills: 3 | Status: SHIPPED | OUTPATIENT
Start: 2023-04-25

## 2023-04-25 RX ORDER — GLUCOSAMINE HCL/CHONDROITIN SU 500-400 MG
CAPSULE ORAL
Qty: 100 STRIP | Refills: 3 | Status: SHIPPED | OUTPATIENT
Start: 2023-04-25

## 2023-04-25 RX ORDER — ATORVASTATIN CALCIUM 40 MG/1
40 TABLET, FILM COATED ORAL DAILY
Qty: 90 TABLET | Refills: 3 | Status: SHIPPED | OUTPATIENT
Start: 2023-04-25

## 2023-04-25 SDOH — ECONOMIC STABILITY: HOUSING INSECURITY
IN THE LAST 12 MONTHS, WAS THERE A TIME WHEN YOU DID NOT HAVE A STEADY PLACE TO SLEEP OR SLEPT IN A SHELTER (INCLUDING NOW)?: NO

## 2023-04-25 SDOH — ECONOMIC STABILITY: FOOD INSECURITY: WITHIN THE PAST 12 MONTHS, YOU WORRIED THAT YOUR FOOD WOULD RUN OUT BEFORE YOU GOT MONEY TO BUY MORE.: NEVER TRUE

## 2023-04-25 SDOH — ECONOMIC STABILITY: INCOME INSECURITY: HOW HARD IS IT FOR YOU TO PAY FOR THE VERY BASICS LIKE FOOD, HOUSING, MEDICAL CARE, AND HEATING?: NOT HARD AT ALL

## 2023-04-25 SDOH — ECONOMIC STABILITY: FOOD INSECURITY: WITHIN THE PAST 12 MONTHS, THE FOOD YOU BOUGHT JUST DIDN'T LAST AND YOU DIDN'T HAVE MONEY TO GET MORE.: NEVER TRUE

## 2023-04-25 ASSESSMENT — ENCOUNTER SYMPTOMS
COLOR CHANGE: 0
CHEST TIGHTNESS: 0
SHORTNESS OF BREATH: 0
DIARRHEA: 0
COUGH: 0
CONSTIPATION: 0
NAUSEA: 0
ABDOMINAL PAIN: 0
SINUS PRESSURE: 0
BACK PAIN: 0
EYE DISCHARGE: 0
ABDOMINAL DISTENTION: 0
SINUS PAIN: 0

## 2023-04-25 NOTE — PATIENT INSTRUCTIONS

## 2023-04-25 NOTE — PROGRESS NOTES
sensation   Vibratory Sensation: normal  Microalbumin due if able to urinate today  Annual eye exam recommended for diabetic retinal exam, please ask eye doctor to fax us the report  [facility] [phone]  Hep B vaccine recommended  Call insurance company to discuss coverage for shingles vaccine (Shingrix) 2 dose series   COVID booster recommended  [place] 2021  [address] UTD 2023      I have reviewed patient's pertinent medical history, relevant laboratory and imaging studies, and past/future health maintenance. Discussed with the patient the importance of adhering to their current medication regimen as directed. Advised the patient that they should continue to work on eating a healthy balanced diet and staying active by exercising within their personal limits. Orders as listed above. Patient was advised to keep future appointments with their respective specialty care team(s). Patient had the opportunity to ask questions, all of which were answered to the best of my ability and with patient satisfaction. Patient understands and is agreeable with the care plan following today's visit. Patient is to schedule an appointment for any new or worsening symptoms. Go to ER for significant shortness of breath, chest pain, or uncontrolled pain or fever. I discussed with patient the risk and benefits of any medications that were prescribed today. I verified that the patient understands their medications, labs, and/or procedures. The patient is doing well with current medication regimen and does not have any barriers to adherence. The patient's self-management abilities are good.      Follow Up in 6 Months for diabetes and Annual Physical Exam with Fasting Labs Yearly

## 2023-04-28 ENCOUNTER — TELEPHONE (OUTPATIENT)
Dept: FAMILY MEDICINE CLINIC | Age: 53
End: 2023-04-28

## 2023-04-28 NOTE — TELEPHONE ENCOUNTER
Check blood sugar in morning before eating anything a few days a week. Goal is blood sugar less than 110.

## 2023-04-28 NOTE — TELEPHONE ENCOUNTER
Pt is calling to see how often you want him to test his sugars? Before breakfast, after, before dinner, after? ?    Pt wants to know what a good reading is or a bad reading?

## 2023-06-29 RX ORDER — LOSARTAN POTASSIUM 100 MG/1
TABLET ORAL
Qty: 90 TABLET | Refills: 0 | Status: SHIPPED | OUTPATIENT
Start: 2023-06-29

## 2023-08-16 ENCOUNTER — TELEPHONE (OUTPATIENT)
Dept: FAMILY MEDICINE CLINIC | Age: 53
End: 2023-08-16

## 2023-08-16 DIAGNOSIS — E11.9 TYPE 2 DIABETES MELLITUS WITHOUT COMPLICATION, WITHOUT LONG-TERM CURRENT USE OF INSULIN (HCC): ICD-10-CM

## 2023-08-16 RX ORDER — GLUCOSAMINE HCL/CHONDROITIN SU 500-400 MG
CAPSULE ORAL
Qty: 100 STRIP | Refills: 3 | Status: SHIPPED | OUTPATIENT
Start: 2023-08-16

## 2023-09-26 RX ORDER — LOSARTAN POTASSIUM 100 MG/1
TABLET ORAL
Qty: 90 TABLET | Refills: 0 | Status: SHIPPED | OUTPATIENT
Start: 2023-09-26

## 2023-10-20 ENCOUNTER — TELEPHONE (OUTPATIENT)
Dept: FAMILY MEDICINE CLINIC | Age: 53
End: 2023-10-20

## 2023-10-20 DIAGNOSIS — E11.9 TYPE 2 DIABETES MELLITUS WITHOUT COMPLICATION, WITHOUT LONG-TERM CURRENT USE OF INSULIN (HCC): ICD-10-CM

## 2023-10-26 ENCOUNTER — OFFICE VISIT (OUTPATIENT)
Dept: FAMILY MEDICINE CLINIC | Age: 53
End: 2023-10-26
Payer: COMMERCIAL

## 2023-10-26 VITALS
WEIGHT: 296 LBS | HEIGHT: 69 IN | OXYGEN SATURATION: 99 % | HEART RATE: 70 BPM | BODY MASS INDEX: 43.84 KG/M2 | SYSTOLIC BLOOD PRESSURE: 132 MMHG | RESPIRATION RATE: 18 BRPM | DIASTOLIC BLOOD PRESSURE: 86 MMHG

## 2023-10-26 DIAGNOSIS — E11.9 TYPE 2 DIABETES MELLITUS WITHOUT COMPLICATION, WITHOUT LONG-TERM CURRENT USE OF INSULIN (HCC): Primary | ICD-10-CM

## 2023-10-26 DIAGNOSIS — N52.9 ERECTILE DISORDER: ICD-10-CM

## 2023-10-26 LAB — HBA1C MFR BLD: 6.9 %

## 2023-10-26 PROCEDURE — G8427 DOCREV CUR MEDS BY ELIG CLIN: HCPCS

## 2023-10-26 PROCEDURE — 83036 HEMOGLOBIN GLYCOSYLATED A1C: CPT

## 2023-10-26 PROCEDURE — 2022F DILAT RTA XM EVC RTNOPTHY: CPT

## 2023-10-26 PROCEDURE — 3044F HG A1C LEVEL LT 7.0%: CPT

## 2023-10-26 PROCEDURE — 3017F COLORECTAL CA SCREEN DOC REV: CPT

## 2023-10-26 PROCEDURE — 3075F SYST BP GE 130 - 139MM HG: CPT

## 2023-10-26 PROCEDURE — 99214 OFFICE O/P EST MOD 30 MIN: CPT

## 2023-10-26 PROCEDURE — 3079F DIAST BP 80-89 MM HG: CPT

## 2023-10-26 PROCEDURE — G8417 CALC BMI ABV UP PARAM F/U: HCPCS

## 2023-10-26 PROCEDURE — G8484 FLU IMMUNIZE NO ADMIN: HCPCS

## 2023-10-26 PROCEDURE — 1036F TOBACCO NON-USER: CPT

## 2023-10-26 RX ORDER — TADALAFIL 5 MG/1
5 TABLET ORAL DAILY
Qty: 30 TABLET | Refills: 3 | Status: SHIPPED | OUTPATIENT
Start: 2023-10-26

## 2023-10-26 RX ORDER — METHOCARBAMOL 750 MG/1
750 TABLET, FILM COATED ORAL EVERY 6 HOURS PRN
COMMUNITY
Start: 2023-10-11

## 2023-10-26 RX ORDER — ISOPROPYL ALCOHOL 700 MG/ML
1 CLOTH TOPICAL DAILY
Qty: 100 EACH | Refills: 3 | Status: SHIPPED | OUTPATIENT
Start: 2023-10-26

## 2023-10-26 ASSESSMENT — ENCOUNTER SYMPTOMS
PHOTOPHOBIA: 0
WHEEZING: 0
SHORTNESS OF BREATH: 0
COUGH: 0
VOMITING: 0
NAUSEA: 0
DIARRHEA: 0
ABDOMINAL PAIN: 0
CONSTIPATION: 0
ABDOMINAL DISTENTION: 0

## 2023-10-26 NOTE — PATIENT INSTRUCTIONS
not be addressed during this visit. Medication Refills: Refills are handled electronically so please contact your pharmacy for medication refills even if current refills have been exhausted. If you are on a controlled medication you will be referred to a specialist (pain specialist, psychiatry, etc). Forms: There is a $35 fee to fill out FMLA/Disability paperwork, payable at time of . Instead of the fee, you can choose to have the paperwork filled out during a separate office visit that is for filling out the paperwork only. Medication Samples: This office does not carry medication samples. If you need assistance in getting your medications, then please let the medical assistant know so they can help you sign up for a drug assistance program that can help get medications at a reduced cost or even free (if you qualify). Workman's Comp Claims: We do not handle workman's comp cases or claims. You will need to go to an urgent care to be seen or to whomever your employer uses. General - Any abusive/rude behavior toward staff/providers may be cause for dismissal.     Yessenia Reid may receive a survey regarding the care you received during your visit. Your input is valuable to us. We encourage you to complete and return your survey. We hope you will choose us in the future for your healthcare needs.

## 2023-10-26 NOTE — PROGRESS NOTES
Reyna Collins (:  1970) is a 48 y.o. male,Established patient, here for evaluation of the following chief complaint(s):  Diabetes (6 mon DM f/u) and Other (Due for DM eye exam, wants to discuss refill terbinafine)         ASSESSMENT/PLAN:  1. Type 2 diabetes mellitus without complication, without long-term current use of insulin (HCC)  -A1c increased to 6.9% versus 6.0% previously. -Treatment options discussed. Keep metformin at 500 mg daily, can increase if A1c gets to 7.0 or greater.  -Discussed diabetic diet. Recommend decreasing potatoes and pasta in diet. Health goals: Weight loss goal of 5-10% of your current body weight with 1-2 pounds of weight loss per week; drink at least 64 ounces of water a day, exercise at least 150 minutes/week, sleep between 6 to 10 hours nightly, consume approximately 2,000 calories daily, and limit toxins in the diet (alcohol, drugs, smoking). Information provided in AVS.  -Can continue 6-month follow-up plan for now, but if A1c gets to 7.0% or greater, we will return to 3-month follow-ups. Patient verbalized understanding.  -     POCT glycosylated hemoglobin (Hb A1C)  -     Isopropyl Alcohol (ALCOHOL WIPES) 70 % MISC; Apply 1 each topically daily, Disp-100 each, R-3Can exchange for any alcohol prep wipesNormal  2. Erectile disorder  -Active dysfunction likely secondary to cardiac medications.  -Treatment options discussed. Tadalafil is being sent to the pharmacy. The medication uses and side effects were discussed with the patient. Patient verbalized understanding and agrees to the plan.  -Educated we will not titrate up. Educated if he has tachycardic episodes or palpitations, then we will discontinue this medication. He verbalized understanding.  -Follow-up in 6 months. -     tadalafil (CIALIS) 5 MG tablet;  Take 1 tablet by mouth daily, Disp-30 tablet, R-3goodRxNormal      Return in about 6 months (around 2024), or if symptoms worsen or fail to

## 2023-11-15 NOTE — PROGRESS NOTES
Fax received for Immunization status: documented Covid vaccine given 10/30/23 at [facility]./ma

## 2023-11-27 ENCOUNTER — TELEPHONE (OUTPATIENT)
Dept: FAMILY MEDICINE CLINIC | Age: 53
End: 2023-11-27

## 2023-11-27 NOTE — TELEPHONE ENCOUNTER
Muscle relaxer and pain pills ordered by Dr. Wyatt.  Ran out and have not taken OTC.  Px currently in PT.

## 2023-11-27 NOTE — TELEPHONE ENCOUNTER
It sounds like he needs to be evaluated in person.  He may benefit from seeing his back specialist.  What has he tried so far for pain?  We can always try physical therapy.  We could even do a steroid, but this could affect his blood sugars.

## 2023-11-27 NOTE — TELEPHONE ENCOUNTER
Patient was calling because he wanted to speak with Chon about the Pain and ache in his legs after surgery. Its been going on for a couple of weeks. When he stands and walks he has pain that is in his thigh to his waist.   Can we please give him a call back

## 2023-12-14 ENCOUNTER — OFFICE VISIT (OUTPATIENT)
Dept: FAMILY MEDICINE CLINIC | Age: 53
End: 2023-12-14
Payer: COMMERCIAL

## 2023-12-14 VITALS
DIASTOLIC BLOOD PRESSURE: 74 MMHG | SYSTOLIC BLOOD PRESSURE: 130 MMHG | OXYGEN SATURATION: 95 % | HEART RATE: 61 BPM | BODY MASS INDEX: 44.43 KG/M2 | WEIGHT: 300 LBS | RESPIRATION RATE: 18 BRPM | HEIGHT: 69 IN

## 2023-12-14 DIAGNOSIS — B00.9 HSV INFECTION: ICD-10-CM

## 2023-12-14 DIAGNOSIS — I48.0 PAROXYSMAL A-FIB (HCC): ICD-10-CM

## 2023-12-14 DIAGNOSIS — M54.50 ACUTE BILATERAL LOW BACK PAIN WITHOUT SCIATICA: Primary | ICD-10-CM

## 2023-12-14 PROCEDURE — 3075F SYST BP GE 130 - 139MM HG: CPT

## 2023-12-14 PROCEDURE — 1036F TOBACCO NON-USER: CPT

## 2023-12-14 PROCEDURE — 3017F COLORECTAL CA SCREEN DOC REV: CPT

## 2023-12-14 PROCEDURE — G8484 FLU IMMUNIZE NO ADMIN: HCPCS

## 2023-12-14 PROCEDURE — 99213 OFFICE O/P EST LOW 20 MIN: CPT

## 2023-12-14 PROCEDURE — G8427 DOCREV CUR MEDS BY ELIG CLIN: HCPCS

## 2023-12-14 PROCEDURE — 3078F DIAST BP <80 MM HG: CPT

## 2023-12-14 PROCEDURE — G8417 CALC BMI ABV UP PARAM F/U: HCPCS

## 2023-12-14 RX ORDER — METHYLPREDNISOLONE 4 MG/1
TABLET ORAL
Qty: 1 KIT | Refills: 0 | Status: SHIPPED | OUTPATIENT
Start: 2023-12-14 | End: 2023-12-20

## 2023-12-14 NOTE — PATIENT INSTRUCTIONS
You can call [phone] to schedule this. You may receive a survey regarding the care you received during your visit. Your input is valuable to us. We encourage you to complete and return your survey. We hope you will choose us in the future for your healthcare needs. GENERAL OFFICE POLICIES      Telephone Calls: Messages will be answered within 1-2 business days, unless the provider is out of the office. If it is urgent a covering provider will answer. (this does not include Medication refills). MyChart: We recommend all patients sign up for [employer]. Through this portal you can see your lab results, request refills, schedule appointments, pay your bill and send messages to the office. [employer] messages will be answered within 1-2 business days unless the provider is out of the office. For urgent matters, please call the office. Appointments:  All appointments must be scheduled. We ask all patients to schedule their next follow up appointment before they leave the office to make sure you will be able to be seen before you run out of medications. 24 hours notice is required to cancel or reschedule an appointment to avoid being marked as a no show. You may be dismissed from the practice after 3 no shows. LATE for Appointment: If you are 15 or more minutes late for your appointment, you may be asked to reschedule. MA/LAB APPTS: Must be scheduled, cannot accept walk in lab visits. We only draw labs for patients established in our office. We only do injections for medications ordered by our office. Acute Sick Visits:  Nothing other than acute complaint will be addressed at this visit. TRADITIONAL MEDICARE  DOES NOT COVER PHYSICALS  MEDICARE WELLNESS VISITS: These are NOT physicals but the free annual visit offered by Medicare to discuss wellness issues. Medication refills, checkups, etc. will not be addressed during this visit.   Medication Refills: Refills are handled electronically so

## 2023-12-14 NOTE — PROGRESS NOTES
Wallace Lee (:  1970) is a 48 y.o. male,Established patient, here for evaluation of the following chief complaint(s):  Pain (Pt c/o lower back and both legs pain )         ASSESSMENT/PLAN:  1. Acute bilateral low back pain without sciatica  -Presentation consistent with acute on chronic bilateral lower back pain. -Treatment options discussed. Medrol Dosepak is being sent to the pharmacy. The medication uses and side effects were discussed with the patient. Patient verbalized understanding and agrees to the plan. -darryl LUNA. Recommend getting back into PT with Rene.  -Follow-up with Rene if no improvement. -     methylPREDNISolone (MEDROL DOSEPACK) 4 MG tablet; Take by mouth., Disp-1 kit, R-0Normal  2. Paroxysmal A-fib (HCC)  -Managing with metoprolol and Coumadin.  -Continue following with cardiology. 3. HSV infection  -Patient requesting recheck levels to determine how his acyclovir is working for him.  -Follow-up as needed. -     Herpes simplex virus (HSV) I/II antibodies IgG & IgM w/ reflex; Future      Return if symptoms worsen or fail to improve. Subjective   SUBJECTIVE/OBJECTIVE:  HPI  He saw his back doctor on , and was told he has arthritis. He has an aching pain. He thinks it is not arthritis. When he washes his dishes, he gets an aching pain in his lower back. It has been going on for a few weeks. He can also get a sharp pain in his left groin as well, especially after walking or sitting for a while. Occasionally it has lost his balance on his hip, he has not fallen. His back doctor ordered an MRI, yet to be scheduled. Rene, XOCHILT through [facility]. He had back surgery in January. It has been okay since then. He has been given muscle relaxers, unsure if they are helping. He was using ibuprofen, helped occasionally. No ice or heat, no intentional stretches or exercise. He did a complete functional evaluation, and was determined he could do light activity.  He did PT

## 2023-12-16 LAB
HSV1 GG IGG SER-ACNC: 4.16 IV
HSV1+2 IGG SER IA-ACNC: >22.4 IV
HSV1+2 IGM SER IA-ACNC: 0.14 IV

## 2024-01-15 DIAGNOSIS — B00.9 HSV INFECTION: ICD-10-CM

## 2024-01-15 RX ORDER — ACYCLOVIR 400 MG/1
TABLET ORAL
Qty: 180 TABLET | Refills: 3 | Status: CANCELLED | OUTPATIENT
Start: 2024-01-15

## 2024-01-15 RX ORDER — ACYCLOVIR 400 MG/1
400 TABLET ORAL 2 TIMES DAILY
COMMUNITY

## 2024-01-15 RX ORDER — ACYCLOVIR 400 MG/1
400 TABLET ORAL 2 TIMES DAILY
COMMUNITY
End: 2024-01-15 | Stop reason: SDUPTHER

## 2024-01-15 RX ORDER — ACYCLOVIR 400 MG/1
400 TABLET ORAL 2 TIMES DAILY
Qty: 180 TABLET | Refills: 0 | Status: SHIPPED | OUTPATIENT
Start: 2024-01-15

## 2024-01-15 NOTE — TELEPHONE ENCOUNTER
Patient was calling to get his medication refilled     Acyclovir 400 MG, 2x's daily, 180 tablets     [facility]   Phone number     Next Visit 4/29/24

## 2024-01-18 DIAGNOSIS — E11.9 TYPE 2 DIABETES MELLITUS WITHOUT COMPLICATION, WITHOUT LONG-TERM CURRENT USE OF INSULIN (HCC): ICD-10-CM

## 2024-01-31 DIAGNOSIS — E11.9 TYPE 2 DIABETES MELLITUS WITHOUT COMPLICATION, WITHOUT LONG-TERM CURRENT USE OF INSULIN (HCC): ICD-10-CM

## 2024-02-01 ENCOUNTER — HOSPITAL ENCOUNTER (OUTPATIENT)
Dept: MRI IMAGING | Age: 54
Discharge: HOME | End: 2024-02-01
Payer: COMMERCIAL

## 2024-02-01 DIAGNOSIS — M43.16 SPONDYLOLISTHESIS OF LUMBAR REGION: ICD-10-CM

## 2024-02-01 DIAGNOSIS — Z98.1 ARTHRODESIS STATUS: ICD-10-CM

## 2024-02-01 DIAGNOSIS — M54.50 LOW BACK PAIN, UNSPECIFIED BACK PAIN LATERALITY, UNSPECIFIED CHRONICITY, UNSPECIFIED WHETHER SCIATICA PRESENT: ICD-10-CM

## 2024-02-01 DIAGNOSIS — M48.062 LUMBAR STENOSIS WITH NEUROGENIC CLAUDICATION: ICD-10-CM

## 2024-02-01 PROCEDURE — 72148 MRI LUMBAR SPINE W/O DYE: CPT

## 2024-03-14 ENCOUNTER — TELEPHONE (OUTPATIENT)
Dept: FAMILY MEDICINE CLINIC | Age: 54
End: 2024-03-14

## 2024-03-14 RX ORDER — ACYCLOVIR 400 MG/1
400 TABLET ORAL 2 TIMES DAILY
Qty: 180 TABLET | Refills: 0 | Status: SHIPPED | OUTPATIENT
Start: 2024-03-14

## 2024-03-14 RX ORDER — ATORVASTATIN CALCIUM 40 MG/1
40 TABLET, FILM COATED ORAL DAILY
COMMUNITY
End: 2024-03-14 | Stop reason: SDUPTHER

## 2024-03-14 RX ORDER — ATORVASTATIN CALCIUM 40 MG/1
40 TABLET, FILM COATED ORAL DAILY
Qty: 90 TABLET | Refills: 0 | Status: SHIPPED | OUTPATIENT
Start: 2024-03-14

## 2024-03-19 RX ORDER — TERBINAFINE HYDROCHLORIDE 250 MG/1
250 TABLET ORAL DAILY
OUTPATIENT
Start: 2024-03-19

## 2024-03-19 RX ORDER — TERBINAFINE HYDROCHLORIDE 250 MG/1
250 TABLET ORAL DAILY
COMMUNITY

## 2024-03-19 NOTE — TELEPHONE ENCOUNTER
LAST VISIT 12*/14/23, NEXT VISIT 4/29/24 Pharmacy refill request.  Change in provider.  Pended.  AM.

## 2024-04-29 ENCOUNTER — OFFICE VISIT (OUTPATIENT)
Dept: FAMILY MEDICINE CLINIC | Age: 54
End: 2024-04-29
Payer: COMMERCIAL

## 2024-04-29 VITALS
OXYGEN SATURATION: 97 % | BODY MASS INDEX: 43.93 KG/M2 | WEIGHT: 296.6 LBS | HEART RATE: 60 BPM | DIASTOLIC BLOOD PRESSURE: 88 MMHG | HEIGHT: 69 IN | SYSTOLIC BLOOD PRESSURE: 138 MMHG

## 2024-04-29 DIAGNOSIS — Z00.00 ENCOUNTER FOR WELL ADULT EXAM WITHOUT ABNORMAL FINDINGS: Primary | ICD-10-CM

## 2024-04-29 DIAGNOSIS — B00.9 HSV INFECTION: ICD-10-CM

## 2024-04-29 DIAGNOSIS — E78.2 MIXED HYPERLIPIDEMIA: ICD-10-CM

## 2024-04-29 DIAGNOSIS — E11.9 TYPE 2 DIABETES MELLITUS WITHOUT COMPLICATION, WITHOUT LONG-TERM CURRENT USE OF INSULIN (HCC): ICD-10-CM

## 2024-04-29 DIAGNOSIS — M54.16 LUMBAR BACK PAIN WITH RADICULOPATHY AFFECTING RIGHT LOWER EXTREMITY: ICD-10-CM

## 2024-04-29 DIAGNOSIS — I48.0 PAROXYSMAL A-FIB (HCC): ICD-10-CM

## 2024-04-29 DIAGNOSIS — Z12.5 SCREENING FOR MALIGNANT NEOPLASM OF PROSTATE: ICD-10-CM

## 2024-04-29 DIAGNOSIS — N52.9 ERECTILE DISORDER: ICD-10-CM

## 2024-04-29 LAB
ALBUMIN SERPL-MCNC: 4.2 G/DL (ref 3.4–5)
ALBUMIN/GLOB SERPL: 1.6 {RATIO} (ref 1.1–2.2)
ALP SERPL-CCNC: 86 U/L (ref 40–129)
ALT SERPL-CCNC: 20 U/L (ref 10–40)
ANION GAP SERPL CALCULATED.3IONS-SCNC: 10 MMOL/L (ref 3–16)
AST SERPL-CCNC: 16 U/L (ref 15–37)
BILIRUB SERPL-MCNC: 0.3 MG/DL (ref 0–1)
BUN SERPL-MCNC: 12 MG/DL (ref 7–20)
CALCIUM SERPL-MCNC: 10.2 MG/DL (ref 8.3–10.6)
CHLORIDE SERPL-SCNC: 106 MMOL/L (ref 99–110)
CHOLEST SERPL-MCNC: 91 MG/DL (ref 0–199)
CO2 SERPL-SCNC: 28 MMOL/L (ref 21–32)
CREAT SERPL-MCNC: 1 MG/DL (ref 0.9–1.3)
CREATININE URINE POCT: 200
GFR SERPLBLD CREATININE-BSD FMLA CKD-EPI: 90 ML/MIN/{1.73_M2}
GLUCOSE SERPL-MCNC: 116 MG/DL (ref 70–99)
HBA1C MFR BLD: 6.4 %
HDLC SERPL-MCNC: 37 MG/DL (ref 40–60)
LDLC SERPL CALC-MCNC: 42 MG/DL
MICROALBUMIN/CREAT 24H UR: 10 MG/G{CREAT}
MICROALBUMIN/CREAT UR-RTO: 30
POTASSIUM SERPL-SCNC: 4.5 MMOL/L (ref 3.5–5.1)
PROT SERPL-MCNC: 6.8 G/DL (ref 6.4–8.2)
PSA SERPL DL<=0.01 NG/ML-MCNC: 0.31 NG/ML (ref 0–4)
SODIUM SERPL-SCNC: 144 MMOL/L (ref 136–145)
TRIGL SERPL-MCNC: 59 MG/DL (ref 0–150)
VLDLC SERPL CALC-MCNC: 12 MG/DL

## 2024-04-29 PROCEDURE — 82044 UR ALBUMIN SEMIQUANTITATIVE: CPT

## 2024-04-29 PROCEDURE — G8417 CALC BMI ABV UP PARAM F/U: HCPCS

## 2024-04-29 PROCEDURE — 3079F DIAST BP 80-89 MM HG: CPT

## 2024-04-29 PROCEDURE — 99396 PREV VISIT EST AGE 40-64: CPT

## 2024-04-29 PROCEDURE — 3017F COLORECTAL CA SCREEN DOC REV: CPT

## 2024-04-29 PROCEDURE — 1036F TOBACCO NON-USER: CPT

## 2024-04-29 PROCEDURE — G8427 DOCREV CUR MEDS BY ELIG CLIN: HCPCS

## 2024-04-29 PROCEDURE — 3044F HG A1C LEVEL LT 7.0%: CPT

## 2024-04-29 PROCEDURE — 99214 OFFICE O/P EST MOD 30 MIN: CPT

## 2024-04-29 PROCEDURE — 3075F SYST BP GE 130 - 139MM HG: CPT

## 2024-04-29 PROCEDURE — 2022F DILAT RTA XM EVC RTNOPTHY: CPT

## 2024-04-29 PROCEDURE — 83036 HEMOGLOBIN GLYCOSYLATED A1C: CPT

## 2024-04-29 RX ORDER — METOPROLOL TARTRATE 50 MG/1
50 TABLET, FILM COATED ORAL 2 TIMES DAILY
Qty: 60 TABLET | Refills: 3 | Status: SHIPPED | OUTPATIENT
Start: 2024-04-29

## 2024-04-29 RX ORDER — RIVAROXABAN 20 MG/1
20 TABLET, FILM COATED ORAL DAILY
COMMUNITY

## 2024-04-29 RX ORDER — TADALAFIL 5 MG/1
5 TABLET ORAL DAILY
Qty: 30 TABLET | Refills: 3 | Status: SHIPPED | OUTPATIENT
Start: 2024-04-29

## 2024-04-29 RX ORDER — ACYCLOVIR 400 MG/1
400 TABLET ORAL 2 TIMES DAILY
Qty: 180 TABLET | Refills: 1 | Status: SHIPPED | OUTPATIENT
Start: 2024-04-29

## 2024-04-29 RX ORDER — ATORVASTATIN CALCIUM 40 MG/1
40 TABLET, FILM COATED ORAL DAILY
Qty: 90 TABLET | Refills: 3 | Status: SHIPPED | OUTPATIENT
Start: 2024-04-29

## 2024-04-29 SDOH — ECONOMIC STABILITY: INCOME INSECURITY: HOW HARD IS IT FOR YOU TO PAY FOR THE VERY BASICS LIKE FOOD, HOUSING, MEDICAL CARE, AND HEATING?: NOT HARD AT ALL

## 2024-04-29 SDOH — ECONOMIC STABILITY: FOOD INSECURITY: WITHIN THE PAST 12 MONTHS, YOU WORRIED THAT YOUR FOOD WOULD RUN OUT BEFORE YOU GOT MONEY TO BUY MORE.: NEVER TRUE

## 2024-04-29 SDOH — ECONOMIC STABILITY: FOOD INSECURITY: WITHIN THE PAST 12 MONTHS, THE FOOD YOU BOUGHT JUST DIDN'T LAST AND YOU DIDN'T HAVE MONEY TO GET MORE.: NEVER TRUE

## 2024-04-29 ASSESSMENT — PATIENT HEALTH QUESTIONNAIRE - PHQ9
SUM OF ALL RESPONSES TO PHQ9 QUESTIONS 1 & 2: 2
SUM OF ALL RESPONSES TO PHQ QUESTIONS 1-9: 2
2. FEELING DOWN, DEPRESSED OR HOPELESS: SEVERAL DAYS
SUM OF ALL RESPONSES TO PHQ QUESTIONS 1-9: 2
1. LITTLE INTEREST OR PLEASURE IN DOING THINGS: SEVERAL DAYS

## 2024-04-29 ASSESSMENT — ENCOUNTER SYMPTOMS
CHEST TIGHTNESS: 0
PHOTOPHOBIA: 0
COUGH: 0
SHORTNESS OF BREATH: 0
RHINORRHEA: 0
CONSTIPATION: 0
COLOR CHANGE: 0
ABDOMINAL DISTENTION: 0
TROUBLE SWALLOWING: 0
DIARRHEA: 0
VOMITING: 0
SORE THROAT: 0
WHEEZING: 0
NAUSEA: 0
ABDOMINAL PAIN: 0
BACK PAIN: 1

## 2024-04-29 NOTE — PATIENT INSTRUCTIONS
FMLA/Disability paperwork, payable at time of . Instead of the fee, you can choose to have the paperwork filled out during a separate office visit that is for filling out the paperwork only.  Medication Samples:  This office does not carry medication samples.  If you need assistance in getting your medications, then please let the medical assistant know so they can help you sign up for a drug assistance program that can help get medications at a reduced cost or even free (if you qualify).  Workman's Comp Claims: We do not handle workman's comp cases or claims. You will need to go to an urgent care to be seen or to whomever your employer uses.  General - Any abusive/rude behavior toward staff/providers may be cause for dismissal.      WE NOW OFFER [employer] SELF-SCHEDULING   IN 3 EASY STEPS    SCHEDULE AN APPT AT YOUR CONVENIENCE WITH NO HOLD/WAIT TIME  IN THE [employer] DILLON SELECT 'SCHEDULE AN APPOINTMENT' FROM THE MENU  CHOOSE DATE/TIME THAT WORKS FOR YOU    If you don't find an appointment time that works for your schedule, you can also submit an appointment request thru [facility].    CONVENIENT QUALITY CARE AT YOUR FINGERTIPS    NOT ON [employer]?  PLEASE ASK ANY STAFF MEMBER You may receive a survey regarding the care you received during your visit.  Your input is valuable to us.  We encourage you to complete and return your survey.  We hope you will choose us in the future for your healthcare needs.        Starting a Weight Loss Plan: Care Instructions  Overview     It can be a challenge to lose weight. But your doctor can help you make a weight-loss plan that meets your needs.  You don't have to make a lot of big changes at once. A better idea might be to focus on small changes and stick with them. When those changes become habit, you can add a few more changes.  Some people find it helpful to take an exercise or nutrition class. If you have questions, ask your doctor about seeing a registered dietitian or an

## 2024-04-29 NOTE — PROGRESS NOTES
Well Adult Note  Name: Kel Bowman Today’s Date: 2024   MRN: [id number] Sex: Male   Age: 53 y.o. Ethnicity: Non- / Non    : 1970 Race: Black /       Kel Bowman is here for well adult exam.  History:  He found he has another slipped disc in his back in February. He has had 2 injections in his back, not helping as much. He has had some falls with his right leg giving out.he gets numbness and tinging in the right leg most often. He has had back injections bilaterally. Additionally, he notes he is seeing cardiology on the  for evaluation of tachycardia/palpations. He was switched from warfarin to xarelto. He feels he has had a lot of stress because of all of this recently. He is concerned he may need another back surgery. He is getting another functional evaluation coming up sooner as well.  He feels he cannot work, but his back specialist is trying to have him go back to work.  He feels he is doing okay managing his stress. He has been playing with his puppy to relieve his stress. Sleep is harder with the back pain. Has to toss and turn to get comfortable, can wake up from the pain, awake for 1-2 hours before he falls back asleep. Sleeping 4-5 hours a night, up twice a night. Standing for a while or sitting for a while can make his back or legs worse. Was L4-L5, L5-S1, now L3-L4. He notices he has been gaining weight. He has been less active with the back. He has working on cutting back on bread, pasta, potatoes. He gets bored, watches movies and snack. Hard to walk his dog for now. He is going to [place] for his back, Dr. Alcaraz, who did his surgery last time. He has done PT last in 2023. He takes muscle relaxers, 2-3 at a time. Nothing else for the back, no ice or heat. He occasionally wears his back brace. He has been checking his blood sugars, usually 130s in the mornings. He has been having some more fruits recently as well. He does not drink coffee,

## 2024-04-30 ENCOUNTER — TELEPHONE (OUTPATIENT)
Dept: ADMINISTRATIVE | Age: 54
End: 2024-04-30

## 2024-04-30 NOTE — TELEPHONE ENCOUNTER
Submitted PA for Tadalafil 5MG tablets   Via CMM Key: [id number]  STATUS: PENDING.    Follow up done daily; if no decision with in three days we will refax.  If another three days goes by with no decision will call the insurance for status.

## 2024-05-01 NOTE — TELEPHONE ENCOUNTER
The medication was DENIED; DENIAL letter uploaded to MEDIA.    If you want an APPEAL; please note in this encounter what new information you would like to APPEAL with.  Once complete route back to PA POOL.    If this requires a response please respond to the pool ( P [facility] MEDICATION PRE-AUTH).      Thank you please advise patient.'

## 2024-06-18 ENCOUNTER — TELEPHONE (OUTPATIENT)
Dept: FAMILY MEDICINE CLINIC | Age: 54
End: 2024-06-18

## 2024-06-18 DIAGNOSIS — E11.9 TYPE 2 DIABETES MELLITUS WITHOUT COMPLICATION, WITHOUT LONG-TERM CURRENT USE OF INSULIN (HCC): ICD-10-CM

## 2024-06-18 RX ORDER — LANCETS 30 GAUGE
1 EACH MISCELLANEOUS DAILY
Qty: 100 EACH | Refills: 3 | Status: SHIPPED | OUTPATIENT
Start: 2024-06-18

## 2024-06-29 DIAGNOSIS — E11.9 TYPE 2 DIABETES MELLITUS WITHOUT COMPLICATION, WITHOUT LONG-TERM CURRENT USE OF INSULIN (HCC): ICD-10-CM

## 2024-08-06 ENCOUNTER — TELEPHONE (OUTPATIENT)
Dept: FAMILY MEDICINE CLINIC | Age: 54
End: 2024-08-06

## 2024-08-06 DIAGNOSIS — E11.9 TYPE 2 DIABETES MELLITUS WITHOUT COMPLICATION, WITHOUT LONG-TERM CURRENT USE OF INSULIN (HCC): ICD-10-CM

## 2024-08-06 DIAGNOSIS — E78.2 MIXED HYPERLIPIDEMIA: ICD-10-CM

## 2024-08-06 RX ORDER — ATORVASTATIN CALCIUM 40 MG/1
40 TABLET, FILM COATED ORAL DAILY
Qty: 90 TABLET | Refills: 0 | Status: SHIPPED | OUTPATIENT
Start: 2024-08-06

## 2024-08-25 DIAGNOSIS — I48.0 PAROXYSMAL A-FIB (HCC): ICD-10-CM

## 2024-08-26 RX ORDER — METOPROLOL TARTRATE 50 MG
50 TABLET ORAL 2 TIMES DAILY
Qty: 60 TABLET | Refills: 3 | Status: SHIPPED | OUTPATIENT
Start: 2024-08-26

## 2024-09-16 RX ORDER — LOSARTAN POTASSIUM 100 MG/1
TABLET ORAL
Qty: 90 TABLET | Refills: 0 | Status: SHIPPED | OUTPATIENT
Start: 2024-09-16

## 2024-10-01 ENCOUNTER — TELEPHONE (OUTPATIENT)
Dept: FAMILY MEDICINE CLINIC | Age: 54
End: 2024-10-01

## 2024-10-01 DIAGNOSIS — E11.9 TYPE 2 DIABETES MELLITUS WITHOUT COMPLICATION, WITHOUT LONG-TERM CURRENT USE OF INSULIN (HCC): ICD-10-CM

## 2024-10-01 RX ORDER — LANCETS 30 GAUGE
1 EACH MISCELLANEOUS DAILY
Qty: 100 EACH | Refills: 3 | Status: SHIPPED | OUTPATIENT
Start: 2024-10-01

## 2024-10-01 NOTE — TELEPHONE ENCOUNTER
Pt needs a refill on his    Lancets (TruMetric Glucose machine) pt tests every other day     Please send to [facility] [phone]

## 2024-10-25 NOTE — TELEPHONE ENCOUNTER
Dr. Gutierres at bedside    Patient is requesting refills on     Metformin   Acyclovir       [id number] [place] [facility] 1947[address]  HARRIET SOLIS. [facility] [phone] - f [phone]

## 2024-10-28 NOTE — PATIENT INSTRUCTIONS
Limit carbs in diet: Sweets, breads, 4P's (potatoes, pizza, pasta, pop). Limit carbohydrates to 200 g daily, 15 to 20 g per snack, 45 to 60 g per meal; Drink at least 64 ounces of water a day, engage in cardiac exercise at least 150 minutes/week, walk more than 10,000 steps daily, sleep between 7 to 9 hours nightly, consume approximately 2,000 calories daily, limit fatty, fried, and ultra-processed foods in the diet, and limit toxins in the diet (alcohol, drugs, smoking). Consider carb counting.    You may receive a survey regarding the care you received during your visit.  Your input is valuable to us.  We encourage you to complete and return your survey.    We hope you will choose us in the future for your healthcare needs.

## 2024-10-29 ENCOUNTER — OFFICE VISIT (OUTPATIENT)
Dept: FAMILY MEDICINE CLINIC | Age: 54
End: 2024-10-29
Payer: COMMERCIAL

## 2024-10-29 VITALS
HEART RATE: 58 BPM | BODY MASS INDEX: 43.13 KG/M2 | SYSTOLIC BLOOD PRESSURE: 130 MMHG | OXYGEN SATURATION: 97 % | WEIGHT: 291.2 LBS | DIASTOLIC BLOOD PRESSURE: 86 MMHG | HEIGHT: 69 IN

## 2024-10-29 DIAGNOSIS — E11.9 TYPE 2 DIABETES MELLITUS WITHOUT COMPLICATION, WITHOUT LONG-TERM CURRENT USE OF INSULIN (HCC): Primary | ICD-10-CM

## 2024-10-29 DIAGNOSIS — B35.4 TINEA CORPORIS: ICD-10-CM

## 2024-10-29 LAB — HBA1C MFR BLD: 6.5 %

## 2024-10-29 PROCEDURE — 3079F DIAST BP 80-89 MM HG: CPT

## 2024-10-29 PROCEDURE — 1036F TOBACCO NON-USER: CPT

## 2024-10-29 PROCEDURE — 99214 OFFICE O/P EST MOD 30 MIN: CPT

## 2024-10-29 PROCEDURE — 3044F HG A1C LEVEL LT 7.0%: CPT

## 2024-10-29 PROCEDURE — 3075F SYST BP GE 130 - 139MM HG: CPT

## 2024-10-29 PROCEDURE — G8484 FLU IMMUNIZE NO ADMIN: HCPCS

## 2024-10-29 PROCEDURE — 2022F DILAT RTA XM EVC RTNOPTHY: CPT

## 2024-10-29 PROCEDURE — G8427 DOCREV CUR MEDS BY ELIG CLIN: HCPCS

## 2024-10-29 PROCEDURE — 83036 HEMOGLOBIN GLYCOSYLATED A1C: CPT

## 2024-10-29 PROCEDURE — 3017F COLORECTAL CA SCREEN DOC REV: CPT

## 2024-10-29 PROCEDURE — G8417 CALC BMI ABV UP PARAM F/U: HCPCS

## 2024-10-29 RX ORDER — CLOTRIMAZOLE 1 %
CREAM (GRAM) TOPICAL
Qty: 1 EACH | Refills: 1 | Status: SHIPPED | OUTPATIENT
Start: 2024-10-29 | End: 2024-10-29

## 2024-10-29 RX ORDER — CLOTRIMAZOLE AND BETAMETHASONE DIPROPIONATE 10; .64 MG/G; MG/G
CREAM TOPICAL
Qty: 1 EACH | Refills: 2 | Status: SHIPPED | OUTPATIENT
Start: 2024-10-29

## 2024-10-29 RX ORDER — ACYCLOVIR 400 MG/1
1 TABLET ORAL
Qty: 9 EACH | Refills: 3 | Status: SHIPPED | OUTPATIENT
Start: 2024-10-29

## 2024-10-29 RX ORDER — RANOLAZINE 500 MG/1
500 TABLET, EXTENDED RELEASE ORAL 2 TIMES DAILY
COMMUNITY

## 2024-10-29 ASSESSMENT — ENCOUNTER SYMPTOMS
VOMITING: 0
ABDOMINAL PAIN: 0
CONSTIPATION: 0
DIARRHEA: 0
ABDOMINAL DISTENTION: 0
COUGH: 0
CHEST TIGHTNESS: 0
PHOTOPHOBIA: 0
EYE ITCHING: 0
COLOR CHANGE: 0
SHORTNESS OF BREATH: 0
WHEEZING: 0
BACK PAIN: 1
NAUSEA: 0
EYE PAIN: 0

## 2024-10-29 NOTE — PROGRESS NOTES
Kel Bowman (:  1970) is a 54 y.o. male,Established patient, here for evaluation of the following chief complaint(s):  Diabetes (Routine follow up for DM, A1C DUE )         Assessment & Plan  Type 2 diabetes mellitus without complication, without long-term current use of insulin (HCC)   -Controlled with A1c 6.5% today  -Continue metformin, healthy lifestyle modifications  --Discussed diabetic diet.  Limit carbs in diet: Sweets, breads, 4P's (potatoes, pizza, pasta, pop). Limit carbohydrates to 200 g daily, 15 to 20 g per snack, 45 to 60 g per meal; Drink at least 64 ounces of water a day, engage in cardiac exercise at least 150 minutes/week, walk more than 10,000 steps daily, sleep between 7 to 9 hours nightly, consume approximately 2,000 calories daily, limit fatty, fried, and ultra-processed foods in the diet, and limit toxins in the diet (alcohol, drugs, smoking).  Information regarding diabetic diet provided in AVS.  Consider carb counting.  -Follow-up in 6 months for physical with fasting blood work    Orders:    POCT glycosylated hemoglobin (Hb A1C)    Continuous Glucose Sensor (DEXCOM G7 SENSOR) MISC; 1 each by Does not apply route every 10 days Checking BG more than 4 times daily    Tinea corporis   Acute condition, new, left shoulder  -Avoid dark, warm, moist conditions  -Treatment options discussed.  Lotrisone is being sent to the pharmacy.   The medication uses and side effects were discussed with the patient.  Patient verbalized understanding and agrees to the plan.  -Follow-up if no improvement  Orders:    clotrimazole-betamethasone (LOTRISONE) 1-0.05 % cream; Apply topically 2 times daily.      Return in 6 months (on 2025) for Physical, Diabetes Follow-up, Fasting Labs.       Subjective   HPI  Diabetes  -doing okay, no problems  -metformin daily  -diet modifications, had a bad day w/ bread and pasta and potatoes  -cut back bread, pasta, and potatoes, not much sweets, no more pop

## 2024-10-29 NOTE — ASSESSMENT & PLAN NOTE
-Controlled with A1c 6.5% today  -Continue metformin, healthy lifestyle modifications  --Discussed diabetic diet.  Limit carbs in diet: Sweets, breads, 4P's (potatoes, pizza, pasta, pop). Limit carbohydrates to 200 g daily, 15 to 20 g per snack, 45 to 60 g per meal; Drink at least 64 ounces of water a day, engage in cardiac exercise at least 150 minutes/week, walk more than 10,000 steps daily, sleep between 7 to 9 hours nightly, consume approximately 2,000 calories daily, limit fatty, fried, and ultra-processed foods in the diet, and limit toxins in the diet (alcohol, drugs, smoking).  Information regarding diabetic diet provided in AVS.  Consider carb counting.  -Follow-up in 6 months for physical with fasting blood work    Orders:    POCT glycosylated hemoglobin (Hb A1C)    Continuous Glucose Sensor (DEXCOM G7 SENSOR) MISC; 1 each by Does not apply route every 10 days Checking BG more than 4 times daily

## 2024-12-24 DIAGNOSIS — E11.9 TYPE 2 DIABETES MELLITUS WITHOUT COMPLICATION, WITHOUT LONG-TERM CURRENT USE OF INSULIN (HCC): ICD-10-CM

## 2025-01-06 RX ORDER — LOSARTAN POTASSIUM 100 MG/1
TABLET ORAL
Qty: 90 TABLET | Refills: 0 | Status: SHIPPED | OUTPATIENT
Start: 2025-01-06

## 2025-01-19 DIAGNOSIS — E11.9 TYPE 2 DIABETES MELLITUS WITHOUT COMPLICATION, WITHOUT LONG-TERM CURRENT USE OF INSULIN (HCC): ICD-10-CM

## 2025-01-19 DIAGNOSIS — E78.2 MIXED HYPERLIPIDEMIA: ICD-10-CM

## 2025-01-19 DIAGNOSIS — B00.9 HSV INFECTION: ICD-10-CM

## 2025-01-20 RX ORDER — ATORVASTATIN CALCIUM 40 MG/1
40 TABLET, FILM COATED ORAL DAILY
Qty: 90 TABLET | Refills: 1 | Status: SHIPPED | OUTPATIENT
Start: 2025-01-20

## 2025-01-20 RX ORDER — ACYCLOVIR 400 MG/1
400 TABLET ORAL 2 TIMES DAILY
Qty: 180 TABLET | Refills: 1 | Status: SHIPPED | OUTPATIENT
Start: 2025-01-20

## 2025-02-07 RX ORDER — LOSARTAN POTASSIUM 100 MG/1
TABLET ORAL
Qty: 90 TABLET | Refills: 0 | OUTPATIENT
Start: 2025-02-07

## 2025-03-26 ENCOUNTER — TELEPHONE (OUTPATIENT)
Dept: FAMILY MEDICINE CLINIC | Age: 55
End: 2025-03-26

## 2025-03-26 DIAGNOSIS — E11.9 TYPE 2 DIABETES MELLITUS WITHOUT COMPLICATION, WITHOUT LONG-TERM CURRENT USE OF INSULIN: ICD-10-CM

## 2025-04-09 RX ORDER — LOSARTAN POTASSIUM 100 MG/1
100 TABLET ORAL DAILY
Qty: 90 TABLET | Refills: 0 | Status: SHIPPED | OUTPATIENT
Start: 2025-04-09

## 2025-05-07 ENCOUNTER — RESULTS FOLLOW-UP (OUTPATIENT)
Dept: FAMILY MEDICINE CLINIC | Age: 55
End: 2025-05-07

## 2025-05-07 ENCOUNTER — OFFICE VISIT (OUTPATIENT)
Dept: FAMILY MEDICINE CLINIC | Age: 55
End: 2025-05-07
Payer: MEDICARE

## 2025-05-07 VITALS
HEART RATE: 76 BPM | OXYGEN SATURATION: 100 % | SYSTOLIC BLOOD PRESSURE: 134 MMHG | DIASTOLIC BLOOD PRESSURE: 86 MMHG | WEIGHT: 292 LBS | HEIGHT: 69 IN | BODY MASS INDEX: 43.25 KG/M2

## 2025-05-07 DIAGNOSIS — Z00.00 MEDICARE ANNUAL WELLNESS VISIT, SUBSEQUENT: Primary | ICD-10-CM

## 2025-05-07 DIAGNOSIS — F52.4 PREMATURE EJACULATION: ICD-10-CM

## 2025-05-07 DIAGNOSIS — Z23 NEED FOR PNEUMOCOCCAL 20-VALENT CONJUGATE VACCINATION: ICD-10-CM

## 2025-05-07 DIAGNOSIS — E11.9 TYPE 2 DIABETES MELLITUS WITHOUT COMPLICATION, WITHOUT LONG-TERM CURRENT USE OF INSULIN (HCC): ICD-10-CM

## 2025-05-07 DIAGNOSIS — I48.0 PAROXYSMAL A-FIB (HCC): ICD-10-CM

## 2025-05-07 DIAGNOSIS — E78.2 MIXED HYPERLIPIDEMIA: ICD-10-CM

## 2025-05-07 LAB
ALBUMIN SERPL-MCNC: 4.2 G/DL (ref 3.4–5)
ALBUMIN/GLOB SERPL: 1.6 {RATIO} (ref 1.1–2.2)
ALP SERPL-CCNC: 84 U/L (ref 40–129)
ALT SERPL-CCNC: 55 U/L (ref 10–40)
ANION GAP SERPL CALCULATED.3IONS-SCNC: 11 MMOL/L (ref 3–16)
AST SERPL-CCNC: 31 U/L (ref 15–37)
BILIRUB SERPL-MCNC: 0.4 MG/DL (ref 0–1)
BUN SERPL-MCNC: 17 MG/DL (ref 7–20)
CALCIUM SERPL-MCNC: 9.8 MG/DL (ref 8.3–10.6)
CHLORIDE SERPL-SCNC: 106 MMOL/L (ref 99–110)
CHOLEST SERPL-MCNC: 117 MG/DL (ref 0–199)
CO2 SERPL-SCNC: 26 MMOL/L (ref 21–32)
CREAT SERPL-MCNC: 1.1 MG/DL (ref 0.9–1.3)
CREAT UR-MCNC: 244 MG/DL (ref 39–259)
GFR SERPLBLD CREATININE-BSD FMLA CKD-EPI: 79 ML/MIN/{1.73_M2}
GLUCOSE SERPL-MCNC: 124 MG/DL (ref 70–99)
HBA1C MFR BLD: 7 %
HDLC SERPL-MCNC: 34 MG/DL (ref 40–60)
LDLC SERPL CALC-MCNC: 67 MG/DL
MICROALBUMIN UR DL<=1MG/L-MCNC: 2.36 MG/DL
MICROALBUMIN/CREAT UR: 9.7 MG/G (ref 0–30)
POTASSIUM SERPL-SCNC: 4.4 MMOL/L (ref 3.5–5.1)
PROT SERPL-MCNC: 6.8 G/DL (ref 6.4–8.2)
SODIUM SERPL-SCNC: 143 MMOL/L (ref 136–145)
TRIGL SERPL-MCNC: 78 MG/DL (ref 0–150)
VLDLC SERPL CALC-MCNC: 16 MG/DL

## 2025-05-07 PROCEDURE — 3075F SYST BP GE 130 - 139MM HG: CPT | Performed by: NURSE PRACTITIONER

## 2025-05-07 PROCEDURE — 3051F HG A1C>EQUAL 7.0%<8.0%: CPT | Performed by: NURSE PRACTITIONER

## 2025-05-07 PROCEDURE — 99213 OFFICE O/P EST LOW 20 MIN: CPT | Performed by: NURSE PRACTITIONER

## 2025-05-07 PROCEDURE — 83036 HEMOGLOBIN GLYCOSYLATED A1C: CPT | Performed by: NURSE PRACTITIONER

## 2025-05-07 PROCEDURE — 36415 COLL VENOUS BLD VENIPUNCTURE: CPT | Performed by: NURSE PRACTITIONER

## 2025-05-07 PROCEDURE — G0009 ADMIN PNEUMOCOCCAL VACCINE: HCPCS | Performed by: NURSE PRACTITIONER

## 2025-05-07 PROCEDURE — G0439 PPPS, SUBSEQ VISIT: HCPCS | Performed by: NURSE PRACTITIONER

## 2025-05-07 PROCEDURE — 90677 PCV20 VACCINE IM: CPT | Performed by: NURSE PRACTITIONER

## 2025-05-07 PROCEDURE — 3079F DIAST BP 80-89 MM HG: CPT | Performed by: NURSE PRACTITIONER

## 2025-05-07 RX ORDER — FLUOXETINE 10 MG/1
10 CAPSULE ORAL DAILY
Qty: 30 CAPSULE | Refills: 0 | Status: SHIPPED | OUTPATIENT
Start: 2025-05-07 | End: 2025-06-06

## 2025-05-07 SDOH — ECONOMIC STABILITY: FOOD INSECURITY: WITHIN THE PAST 12 MONTHS, YOU WORRIED THAT YOUR FOOD WOULD RUN OUT BEFORE YOU GOT MONEY TO BUY MORE.: NEVER TRUE

## 2025-05-07 SDOH — ECONOMIC STABILITY: FOOD INSECURITY: WITHIN THE PAST 12 MONTHS, THE FOOD YOU BOUGHT JUST DIDN'T LAST AND YOU DIDN'T HAVE MONEY TO GET MORE.: NEVER TRUE

## 2025-05-07 ASSESSMENT — PATIENT HEALTH QUESTIONNAIRE - PHQ9
1. LITTLE INTEREST OR PLEASURE IN DOING THINGS: NOT AT ALL
SUM OF ALL RESPONSES TO PHQ QUESTIONS 1-9: 0
2. FEELING DOWN, DEPRESSED OR HOPELESS: NOT AT ALL
SUM OF ALL RESPONSES TO PHQ QUESTIONS 1-9: 0

## 2025-05-07 ASSESSMENT — LIFESTYLE VARIABLES
HOW MANY STANDARD DRINKS CONTAINING ALCOHOL DO YOU HAVE ON A TYPICAL DAY: 3 OR 4
HOW OFTEN DO YOU HAVE A DRINK CONTAINING ALCOHOL: 2-4 TIMES A MONTH

## 2025-05-07 NOTE — PROGRESS NOTES
Medicare Annual Wellness Visit    Kel Bowman is here for Medicare AWV (MEDICARE AWV and fasting labs ) and Diabetes (Routine follow up for DM, A1C MICRO DUE )    Kel was seen today for medicare awv and diabetes.    Diagnoses and all orders for this visit:    Medicare annual wellness visit, subsequent  HEALTH MAINTENANCE RECOMMENDATIONS REVIEWED WITH PT  VACCINES DECLINED  Type 2 diabetes mellitus without complication, without long-term current use of insulin (HCC)  -     POCT glycosylated hemoglobin (Hb A1C) 7.0 GOOD CONTROL  -      DIABETES FOOT EXAM  -     Albumin/Creatinine Ratio, Urine; Future  -     Comprehensive Metabolic Panel; Future  -     AFL - Andriy Thomas MD, Ophthalmology, [facility]    Paroxysmal A-fib (HCC)  FOLLOW UP WITH CARDIOLOGY  Premature ejaculation  -     FLUoxetine (PROZAC) 10 MG capsule; Take 1 capsule by mouth daily [facility]    Mixed hyperlipidemia  -     Lipid Panel; Future      Need for pneumococcal 20-valent conjugate vaccination  -     Pneumococcal, PCV20, PREVNAR 20, (age 6w+), IM, PF          Subjective       He would like medication for premature ejaculation - he can have an erection but has trouble with stamina  Has tried cialis but it did not work      Diabetes he does check  his blood sugars at home as low as 117-133  Diagnosed with diabetes  about ten years ago when diagnosed with AFIB  He does limit  his carb intake   He takes his  metformin 500 mg daily  He is not exercising due to chronic back pain  He denies increased thirst - frequent urination - vision changes  Last eye exam one year  Patient's complete Health Risk Assessment and screening values have been reviewed and are found in Flowsheets. The following problems were reviewed today and where indicated follow up appointments were made and/or referrals ordered.    Positive Risk Factor Screenings with Interventions:    Fall Risk:  Do you feel unsteady or are you worried about falling? : (!) yes  2 or

## 2025-05-07 NOTE — PROGRESS NOTES
Immunizations Administered       Name Date Dose Route    Pneumococcal, PCV20, PREVNAR 20, (age 6w+), IM, 0.5mL 5/7/2025 0.5 mL Intramuscular    Site: Deltoid- Left    Lot: [id number]    NDC: 0709-2117-89

## 2025-05-17 DIAGNOSIS — E11.9 TYPE 2 DIABETES MELLITUS WITHOUT COMPLICATION, WITHOUT LONG-TERM CURRENT USE OF INSULIN (HCC): ICD-10-CM

## 2025-05-17 DIAGNOSIS — E78.2 MIXED HYPERLIPIDEMIA: ICD-10-CM

## 2025-05-17 DIAGNOSIS — B00.9 HSV INFECTION: ICD-10-CM

## 2025-05-19 RX ORDER — ATORVASTATIN CALCIUM 40 MG/1
40 TABLET, FILM COATED ORAL DAILY
Qty: 90 TABLET | Refills: 1 | OUTPATIENT
Start: 2025-05-19

## 2025-05-19 RX ORDER — ACYCLOVIR 400 MG/1
400 TABLET ORAL 2 TIMES DAILY
Qty: 180 TABLET | Refills: 1 | OUTPATIENT
Start: 2025-05-19

## 2025-05-22 DIAGNOSIS — F52.4 PREMATURE EJACULATION: ICD-10-CM

## 2025-05-22 RX ORDER — FLUOXETINE 10 MG/1
CAPSULE ORAL DAILY
Qty: 90 CAPSULE | Refills: 1 | Status: SHIPPED | OUTPATIENT
Start: 2025-05-22

## 2025-05-22 RX ORDER — LOSARTAN POTASSIUM 100 MG/1
100 TABLET ORAL DAILY
Qty: 90 TABLET | Refills: 0 | Status: SHIPPED | OUTPATIENT
Start: 2025-05-22

## 2025-06-17 DIAGNOSIS — I48.0 PAROXYSMAL A-FIB (HCC): ICD-10-CM

## 2025-06-17 DIAGNOSIS — E11.9 TYPE 2 DIABETES MELLITUS WITHOUT COMPLICATION, WITHOUT LONG-TERM CURRENT USE OF INSULIN (HCC): ICD-10-CM

## 2025-06-17 RX ORDER — METOPROLOL TARTRATE 50 MG
50 TABLET ORAL 2 TIMES DAILY
Qty: 180 TABLET | Refills: 1 | Status: SHIPPED | OUTPATIENT
Start: 2025-06-17

## 2025-08-10 DIAGNOSIS — E11.9 TYPE 2 DIABETES MELLITUS WITHOUT COMPLICATION, WITHOUT LONG-TERM CURRENT USE OF INSULIN (HCC): ICD-10-CM

## 2025-08-10 DIAGNOSIS — B00.9 HSV INFECTION: ICD-10-CM

## 2025-08-10 DIAGNOSIS — E78.2 MIXED HYPERLIPIDEMIA: ICD-10-CM

## 2025-08-11 RX ORDER — ACYCLOVIR 400 MG/1
400 TABLET ORAL 2 TIMES DAILY
Qty: 180 TABLET | Refills: 1 | Status: SHIPPED | OUTPATIENT
Start: 2025-08-11

## 2025-08-11 RX ORDER — ATORVASTATIN CALCIUM 40 MG/1
40 TABLET, FILM COATED ORAL DAILY
Qty: 90 TABLET | Refills: 1 | Status: SHIPPED | OUTPATIENT
Start: 2025-08-11

## (undated) DEVICE — GLOVE SURG SZ 75 L12IN FNGR THK94MIL TRNSLUC YEL LTX

## (undated) DEVICE — GLOVE SURG SZ 85 L12IN FNGR ORTHO 126MIL CRM LTX FREE

## (undated) DEVICE — SPONGE,NEURO,0.5"X3",XR,STRL,LF,10/PK: Brand: MEDLINE

## (undated) DEVICE — PACK,UNIVERSAL,NO GOWNS: Brand: MEDLINE

## (undated) DEVICE — SOLUTION IV 1000ML 0.9% SOD CHL

## (undated) DEVICE — COVER LT HNDL CAM BLU DISP W/ SURG CTRL

## (undated) DEVICE — SUTURE VIC COAT BR VIO CP2 4-0 18IN J392H

## (undated) DEVICE — STANDARD HYPODERMIC NEEDLE,POLYPROPYLENE HUB: Brand: MONOJECT

## (undated) DEVICE — COVER,TABLE,HEAVY DUTY,77"X90",STRL: Brand: MEDLINE

## (undated) DEVICE — SUTURE MCRYL SZ 4-0 L27IN ABSRB UD L19MM PS-2 1/2 CIR PRIM Y426H

## (undated) DEVICE — APPLIER CLP L9.375IN APER 2.1MM CLS L3.8MM 20 SM TI CLP

## (undated) DEVICE — DRAPE MICSCP W54XL150IN W/ 4 BINOC GLS LENS LEICA

## (undated) DEVICE — GAUZE,SPONGE,4"X4",16PLY,XRAY,STRL,LF: Brand: MEDLINE

## (undated) DEVICE — GLOVE SURG SZ 8 CRM LTX FREE POLYISOPRENE POLYMER BEAD ANTI

## (undated) DEVICE — SYRINGE IRRIG 60ML SFT PLIABLE BLB EZ TO GRP 1 HND USE W/

## (undated) DEVICE — SPK10110 JACKSON TABLE KIT: Brand: SPK10110 JACKSON TABLE KIT

## (undated) DEVICE — SHEET, T, LAPAROTOMY, STERILE: Brand: MEDLINE

## (undated) DEVICE — SPONGE,LAP,18"X18",DLX,XR,ST,5/PK,40/PK: Brand: MEDLINE

## (undated) DEVICE — SUTURE VCRL SZ 0 L18IN ABSRB UD L36MM CT-1 1/2 CIR J840D

## (undated) DEVICE — TOWEL,STOP FLAG GOLD N-W: Brand: MEDLINE

## (undated) DEVICE — CODMAN® SURGICAL PATTIES 3/4" X 3/4" (1.91CM X 1.91CM): Brand: CODMAN®

## (undated) DEVICE — GLOVE SURG SZ 65 CRM LTX FREE POLYISOPRENE POLYMER BEAD ANTI

## (undated) DEVICE — C-ARM: Brand: UNBRANDED

## (undated) DEVICE — DISSECTOR LAP DIA5MM BLNT TIP ENDOPATH

## (undated) DEVICE — DRAPE 33X23IN INCISE ANTIMICROB IOBAN 2

## (undated) DEVICE — TUBING, SUCTION, 1/4" X 12', STRAIGHT: Brand: MEDLINE

## (undated) DEVICE — UNDERGLOVE SURG SZ 8 BLU LTX FREE SYN POLYISOPRENE POLYMER

## (undated) DEVICE — BLADE ES ELASTOMERIC COAT INSUL DURABLE BEND UPTO 90DEG

## (undated) DEVICE — SUTURE VCRL SZ 3-0 L18IN ABSRB UD L26MM SH 1/2 CIR J864D

## (undated) DEVICE — 3M™ IOBAN™ 2 ANTIMICROBIAL INCISE DRAPE 6648EZ: Brand: IOBAN™ 2

## (undated) DEVICE — TUBING SUCT 12FR L9IN FN CAP TIP W O TROL VENT FLX DEHP FREE

## (undated) DEVICE — Z INACTIVE USE 2855094 SPONGE SURG W3 8IN WHT COT RND PNUT DISECT W COUNT CRD RADPQ

## (undated) DEVICE — GLOVE SURG SZ 8 L12IN FNGR THK94MIL TRNSLUC YEL LTX HYDRGEL

## (undated) DEVICE — TOOL 14MH30 LEGEND 14CM 3MM: Brand: MIDAS REX ™

## (undated) DEVICE — APPLICATOR PREP 26ML 0.7% IOD POVACRYLEX 74% ISO ALC ST

## (undated) DEVICE — SUTURE VCRL SZ 2-0 L18IN ABSRB UD CT-1 L36MM 1/2 CIR J839D

## (undated) DEVICE — TUBING IRD300 5PK IPC IRRIGATION

## (undated) DEVICE — SUTURE PERMAHAND SZ 2-0 L30IN NONABSORBABLE BLK SILK W/O A305H

## (undated) DEVICE — SUTURE ETHLN SZ 3-0 L30IN NONABSORBABLE BLK L36MM FSLX 3/8 1673BH

## (undated) DEVICE — GLOVE SURG SZ 7 L12IN FNGR THK79MIL GRN LTX FREE

## (undated) DEVICE — LAMINECTOMY: Brand: MEDLINE INDUSTRIES, INC.

## (undated) DEVICE — SUTURE PDS II SZ 0 L60IN ABSRB VLT L48MM CTX 1/2 CIR Z990G

## (undated) DEVICE — UNDERGLOVE SURG SZ 8.5 FNGR THK0.21MIL GRN LTX BEAD CUF

## (undated) DEVICE — 3M™ TEGADERM™ TRANSPARENT FILM DRESSING FRAME STYLE, 1626W, 4 IN X 4-3/4 IN (10 CM X 12 CM), 50/CT 4CT/CASE: Brand: 3M™ TEGADERM™

## (undated) DEVICE — SUTURE VCRL SZ 3-0 L27IN ABSRB UD L36MM CT-1 1/2 CIR J258H

## (undated) DEVICE — GLOVE ORANGE PI 8   MSG9080

## (undated) DEVICE — SURE SET-DOUBLE BASIN-LF: Brand: MEDLINE INDUSTRIES, INC.

## (undated) DEVICE — APPLIER LIG CLP M L11IN TI STR RNG HNDL FOR 20 CLP DISP

## (undated) DEVICE — NEURO SPONGES: Brand: DEROYAL

## (undated) DEVICE — NEURO FUSION ADD-ON PACK: Brand: MEDLINE INDUSTRIES, INC.

## (undated) DEVICE — CABLE BPLR L12FT FLYING LD DISPOSABLE

## (undated) DEVICE — BLADE ES L4IN INSUL EDGE

## (undated) DEVICE — SUTURE VCRL SZ 0 L18IN ABSRB VLT L26MM CT-2 1/2 CIR J727D

## (undated) DEVICE — GLOVE SURG SZ 75 L12IN FNGR THK79MIL GRN LTX FREE